# Patient Record
Sex: FEMALE | Race: BLACK OR AFRICAN AMERICAN | NOT HISPANIC OR LATINO | Employment: UNEMPLOYED | URBAN - METROPOLITAN AREA
[De-identification: names, ages, dates, MRNs, and addresses within clinical notes are randomized per-mention and may not be internally consistent; named-entity substitution may affect disease eponyms.]

---

## 2023-03-30 DIAGNOSIS — E11.40 DIABETIC NEUROPATHY, PAINFUL (MULTI): Primary | ICD-10-CM

## 2023-03-30 DIAGNOSIS — N76.0 ACUTE VAGINITIS: ICD-10-CM

## 2023-03-30 RX ORDER — GABAPENTIN 800 MG/1
1 TABLET ORAL 3 TIMES DAILY
COMMUNITY
Start: 2022-01-25 | End: 2023-03-30 | Stop reason: SDUPTHER

## 2023-03-30 RX ORDER — FLUCONAZOLE 150 MG/1
TABLET ORAL
COMMUNITY
Start: 2022-06-28 | End: 2023-03-30 | Stop reason: SDUPTHER

## 2023-03-31 RX ORDER — GABAPENTIN 800 MG/1
800 TABLET ORAL 3 TIMES DAILY
Qty: 270 TABLET | Refills: 0 | Status: SHIPPED | OUTPATIENT
Start: 2023-03-31 | End: 2023-07-12 | Stop reason: SDUPTHER

## 2023-03-31 RX ORDER — FLUCONAZOLE 150 MG/1
150 TABLET ORAL ONCE
Qty: 1 TABLET | Refills: 0 | Status: SHIPPED | OUTPATIENT
Start: 2023-03-31 | End: 2023-03-31

## 2023-05-26 ENCOUNTER — APPOINTMENT (OUTPATIENT)
Dept: PRIMARY CARE | Facility: CLINIC | Age: 43
End: 2023-05-26
Payer: COMMERCIAL

## 2023-06-30 ENCOUNTER — LAB (OUTPATIENT)
Dept: LAB | Facility: LAB | Age: 43
End: 2023-06-30
Payer: COMMERCIAL

## 2023-06-30 ENCOUNTER — OFFICE VISIT (OUTPATIENT)
Dept: PRIMARY CARE | Facility: CLINIC | Age: 43
End: 2023-06-30
Payer: COMMERCIAL

## 2023-06-30 VITALS
OXYGEN SATURATION: 100 % | BODY MASS INDEX: 26.4 KG/M2 | HEART RATE: 105 BPM | TEMPERATURE: 97.1 F | SYSTOLIC BLOOD PRESSURE: 126 MMHG | WEIGHT: 168.2 LBS | HEIGHT: 67 IN | DIASTOLIC BLOOD PRESSURE: 83 MMHG

## 2023-06-30 DIAGNOSIS — E11.69 TYPE 2 DIABETES MELLITUS WITH OTHER SPECIFIED COMPLICATION, WITH LONG-TERM CURRENT USE OF INSULIN (MULTI): ICD-10-CM
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
## 2023-06-30 DIAGNOSIS — E11.69 TYPE 2 DIABETES MELLITUS WITH OTHER SPECIFIED COMPLICATION, WITH LONG-TERM CURRENT USE OF INSULIN (MULTI): Primary | ICD-10-CM

## 2023-06-30 DIAGNOSIS — K59.00 CONSTIPATION, UNSPECIFIED CONSTIPATION TYPE: ICD-10-CM

## 2023-06-30 DIAGNOSIS — Z79.4 TYPE 2 DIABETES MELLITUS WITH OTHER SPECIFIED COMPLICATION, WITH LONG-TERM CURRENT USE OF INSULIN (MULTI): Primary | ICD-10-CM

## 2023-06-30 DIAGNOSIS — R39.15 URINARY URGENCY: ICD-10-CM

## 2023-06-30 DIAGNOSIS — Z79.4 TYPE 2 DIABETES MELLITUS WITH OTHER SPECIFIED COMPLICATION, WITH LONG-TERM CURRENT USE OF INSULIN (MULTI): ICD-10-CM

## 2023-06-30 LAB
ALBUMIN (G/DL) IN SER/PLAS: 3.9 G/DL (ref 3.4–5)
ANION GAP IN SER/PLAS: 11 MMOL/L (ref 10–20)
CALCIUM (MG/DL) IN SER/PLAS: 9.2 MG/DL (ref 8.6–10.6)
CARBON DIOXIDE, TOTAL (MMOL/L) IN SER/PLAS: 26 MMOL/L (ref 21–32)
CHLORIDE (MMOL/L) IN SER/PLAS: 104 MMOL/L (ref 98–107)
CLUE CELLS WET PREP: ABNORMAL
CREATININE (MG/DL) IN SER/PLAS: 0.96 MG/DL (ref 0.5–1.05)
ESTIMATED AVERAGE GLUCOSE FOR HBA1C: 189 MG/DL
GFR FEMALE: 75 ML/MIN/1.73M2
GLUCOSE (MG/DL) IN SER/PLAS: 192 MG/DL (ref 74–99)
HEMOGLOBIN A1C/HEMOGLOBIN TOTAL IN BLOOD: 8.2 %
PHOSPHATE (MG/DL) IN SER/PLAS: 4 MG/DL (ref 2.5–4.9)
POTASSIUM (MMOL/L) IN SER/PLAS: 3.6 MMOL/L (ref 3.5–5.3)
SODIUM (MMOL/L) IN SER/PLAS: 137 MMOL/L (ref 136–145)
TRICH WET PREP: ABNORMAL
UREA NITROGEN (MG/DL) IN SER/PLAS: 7 MG/DL (ref 6–23)
WBC WET PREP: ABNORMAL /HPF
YEAST PRESENCE WET PREP: PRESENT

## 2023-06-30 PROCEDURE — 4010F ACE/ARB THERAPY RXD/TAKEN: CPT | Performed by: STUDENT IN AN ORGANIZED HEALTH CARE EDUCATION/TRAINING PROGRAM

## 2023-06-30 PROCEDURE — 83036 HEMOGLOBIN GLYCOSYLATED A1C: CPT

## 2023-06-30 PROCEDURE — 3079F DIAST BP 80-89 MM HG: CPT | Performed by: STUDENT IN AN ORGANIZED HEALTH CARE EDUCATION/TRAINING PROGRAM

## 2023-06-30 PROCEDURE — 87210 SMEAR WET MOUNT SALINE/INK: CPT

## 2023-06-30 PROCEDURE — 36415 COLL VENOUS BLD VENIPUNCTURE: CPT

## 2023-06-30 PROCEDURE — 87591 N.GONORRHOEAE DNA AMP PROB: CPT

## 2023-06-30 PROCEDURE — 99213 OFFICE O/P EST LOW 20 MIN: CPT | Performed by: STUDENT IN AN ORGANIZED HEALTH CARE EDUCATION/TRAINING PROGRAM

## 2023-06-30 PROCEDURE — 80069 RENAL FUNCTION PANEL: CPT

## 2023-06-30 PROCEDURE — 87491 CHLMYD TRACH DNA AMP PROBE: CPT

## 2023-06-30 PROCEDURE — 1036F TOBACCO NON-USER: CPT | Performed by: STUDENT IN AN ORGANIZED HEALTH CARE EDUCATION/TRAINING PROGRAM

## 2023-06-30 PROCEDURE — 87661 TRICHOMONAS VAGINALIS AMPLIF: CPT

## 2023-06-30 PROCEDURE — 82570 ASSAY OF URINE CREATININE: CPT

## 2023-06-30 PROCEDURE — 3074F SYST BP LT 130 MM HG: CPT | Performed by: STUDENT IN AN ORGANIZED HEALTH CARE EDUCATION/TRAINING PROGRAM

## 2023-06-30 PROCEDURE — 84156 ASSAY OF PROTEIN URINE: CPT

## 2023-06-30 PROCEDURE — 3052F HG A1C>EQUAL 8.0%<EQUAL 9.0%: CPT | Performed by: STUDENT IN AN ORGANIZED HEALTH CARE EDUCATION/TRAINING PROGRAM

## 2023-06-30 RX ORDER — INSULIN GLARGINE 100 [IU]/ML
75 INJECTION, SOLUTION SUBCUTANEOUS NIGHTLY
Qty: 67.5 ML | Refills: 3 | Status: SHIPPED | OUTPATIENT
Start: 2023-06-30 | End: 2023-07-12 | Stop reason: SDUPTHER

## 2023-06-30 RX ORDER — LANCETS
EACH MISCELLANEOUS
Qty: 100 EACH | Refills: 3 | Status: SHIPPED | OUTPATIENT
Start: 2023-06-30 | End: 2024-03-22

## 2023-06-30 RX ORDER — INSULIN LISPRO 100 [IU]/ML
INJECTION, SOLUTION INTRAVENOUS; SUBCUTANEOUS
COMMUNITY
End: 2023-06-30 | Stop reason: SDUPTHER

## 2023-06-30 RX ORDER — PEN NEEDLE, DIABETIC 30 GX3/16"
NEEDLE, DISPOSABLE MISCELLANEOUS
Qty: 100 EACH | Refills: 11 | Status: SHIPPED | OUTPATIENT
Start: 2023-06-30 | End: 2024-05-31 | Stop reason: SDUPTHER

## 2023-06-30 RX ORDER — ATORVASTATIN CALCIUM 40 MG/1
TABLET, FILM COATED ORAL
COMMUNITY
Start: 2022-06-30 | End: 2023-07-23 | Stop reason: SDUPTHER

## 2023-06-30 RX ORDER — INSULIN LISPRO 100 [IU]/ML
75 INJECTION, SOLUTION INTRAVENOUS; SUBCUTANEOUS NIGHTLY
Qty: 10 ML | Refills: 3 | Status: SHIPPED | OUTPATIENT
Start: 2023-06-30 | End: 2023-06-30 | Stop reason: ENTERED-IN-ERROR

## 2023-06-30 RX ORDER — ISOPROPYL ALCOHOL 70 ML/100ML
LIQUID TOPICAL DAILY
Qty: 3840 ML | Refills: 0 | Status: SHIPPED | OUTPATIENT
Start: 2023-06-30 | End: 2024-06-29

## 2023-06-30 RX ORDER — POLYETHYLENE GLYCOL 3350 17 G/17G
17 POWDER, FOR SOLUTION ORAL DAILY
Qty: 120 PACKET | Refills: 0 | Status: SHIPPED | OUTPATIENT
Start: 2023-06-30 | End: 2023-10-28

## 2023-06-30 RX ORDER — BLOOD-GLUCOSE SENSOR
1 EACH MISCELLANEOUS 2 TIMES DAILY
Qty: 1 EACH | Refills: 0 | Status: SHIPPED | OUTPATIENT
Start: 2023-06-30 | End: 2023-07-23

## 2023-06-30 ASSESSMENT — PAIN SCALES - GENERAL: PAINLEVEL: 8

## 2023-06-30 NOTE — PROGRESS NOTES
"Subjective   Patient ID: Patricia Vieyra is a 42 y.o. female who presents for No chief complaint on file..    HPI   42-year-old female with past medical history of hypertension, bipolar, uncontrolled DM2 complicated by neuropathy, glaucoma who presents to the clinic for follow-up on diabetes and medication refill.    # Diabetes  - Current regimen: Lantus 75 units at bedtime, Jardiance 25 mg every day, Trulicity 1.5 mg weekly  - Diabetes Surveillance: last HbA1C 8.2%  - Diabetes Complications: Neuropathy (on Gabapentin 800 mg TID)  - Denies any polyuria, polydipsia, dysuria, fatigue, weight loss, changes in vision, changes in bowels, HA dizziness, lightheadedness, or weakness.   -Quit smoking 4-5 months ago    #Recurrent Vaginal yeast infection   -Requesting to be tested for GC/CT/Tric   -No vaginal discharge, dysuria, flank/pelvic/abdominal pain  -Endorses \"urinary pressure\"  -Previously has Diflucan several times for yeast infection    Review of Systems    ROS:    - CONSTITUTIONAL: Denies weight loss, fever and chills.    - HEENT: Denies changes in vision and hearing.    - RESPIRATORY: Denies SOB and cough.    - CV: Denies palpitations and CP.    - GI: Denies abdominal pain, nausea, vomiting and diarrhea.    - : Denies dysuria and urinary frequency.    - MSK: Denies myalgia and joint pain.    - SKIN: Denies rash and pruritus.    - NEUROLOGICAL: Denies headache and syncope.    A 12-point review of systems was completed and is otherwise negative except as noted in the HPI.      Objective     Vitals:    06/30/23 0947   BP: 126/83   Pulse: 105   Temp: 36.2 °C (97.1 °F)   SpO2: 100%        Physical Exam    PHYSICAL EXAM:    - GENERAL: Alert and oriented x 3. No acute distress. Well-nourished.    - EYES: EOMI. Anicteric.    - HENT: Moist mucous membranes. No scleral icterus. No cervical lymphadenopathy.    - LUNGS: Clear to auscultation bilaterally. No accessory muscle use.    - CARDIOVASCULAR: Regular rate and rhythm. " No murmur. No JVD.    - ABDOMEN: Soft, non-tender and non-distended. No palpable masses.    - EXTREMITIES: No edema. Non-tender.?SKIN: No rashes or lesions. Warm.    - NEUROLOGIC: No focal neurological deficits. CN II-XII grossly intact, but not individually tested.    - PSYCHIATRIC: Cooperative. Appropriate mood and affect.     Assessment/Plan       42-year-old female with past medical history of hypertension, bipolar, uncontrolled DM2 complicated by neuropathy, glaucoma who presents to the clinic for follow-up on diabetes and medication refill.    #DM2, Last A1c 8.2%  -Continue with Lantus 75 units at bedtime, Jardiance 25 mg every day, Trulicity 1.5 mg weekly  -Continue with lisinopril 10 mg once daily  -Continue with atorvastatin 40 mg once daily  -Continue with gabapentin 800 mg 3 times daily for neuropathy  -Labs: A1c, RFP ordered  -Diabetic supplies ordered  -Hypoglycemic precautions provided    #Recurrent Vaginal yeast infection   -GC/CT/Trich/wet prep  -Will Rx Diflucan in event of another yeast infection     Patient discussed with attending physician Dr. Phan Woods MD  Family Medicine, PGY-2    This note was completed using Dragon voice recognition technology and may include unintended errors with respect to translation of words, typographical errors or grammar errors which may not have been identified while finalizing the chart.

## 2023-07-01 LAB
CHLAMYDIA TRACH., AMPLIFIED: NEGATIVE
N. GONORRHEA, AMPLIFIED: NEGATIVE
TRICHOMONAS VAGINALIS: NEGATIVE

## 2023-07-02 DIAGNOSIS — E11.69 TYPE 2 DIABETES MELLITUS WITH OTHER SPECIFIED COMPLICATION, WITH LONG-TERM CURRENT USE OF INSULIN (MULTI): ICD-10-CM

## 2023-07-02 DIAGNOSIS — Z79.4 TYPE 2 DIABETES MELLITUS WITH OTHER SPECIFIED COMPLICATION, WITH LONG-TERM CURRENT USE OF INSULIN (MULTI): ICD-10-CM

## 2023-07-02 DIAGNOSIS — B37.31 YEAST INFECTION OF THE VAGINA: Primary | ICD-10-CM

## 2023-07-02 RX ORDER — EMPAGLIFLOZIN 25 MG/1
25 TABLET, FILM COATED ORAL DAILY
COMMUNITY
End: 2023-12-22 | Stop reason: SINTOL

## 2023-07-02 RX ORDER — DULAGLUTIDE 1.5 MG/.5ML
1.5 INJECTION, SOLUTION SUBCUTANEOUS
COMMUNITY
Start: 2022-01-04 | End: 2023-07-02 | Stop reason: SDUPTHER

## 2023-07-02 RX ORDER — FLUCONAZOLE 150 MG/1
150 TABLET ORAL ONCE
Qty: 1 TABLET | Refills: 0 | Status: SHIPPED | OUTPATIENT
Start: 2023-07-02 | End: 2023-07-23

## 2023-07-02 RX ORDER — DULAGLUTIDE 1.5 MG/.5ML
1.5 INJECTION, SOLUTION SUBCUTANEOUS
Qty: 8 EACH | Refills: 2 | Status: SHIPPED | OUTPATIENT
Start: 2023-07-02 | End: 2023-07-12 | Stop reason: SDUPTHER

## 2023-07-03 LAB
CREATININE (MG/DL) IN URINE: 138 MG/DL (ref 20–320)
PROTEIN (MG/DL) IN URINE: 27 MG/DL (ref 5–24)
PROTEIN/CREATININE (MG/MG) IN URINE: 0.2 MG/MG CREAT (ref 0–0.17)

## 2023-07-09 RX ORDER — LISINOPRIL 10 MG/1
10 TABLET ORAL DAILY
COMMUNITY
Start: 2022-06-28 | End: 2023-07-23 | Stop reason: SDUPTHER

## 2023-07-09 RX ORDER — LISINOPRIL 10 MG/1
10 TABLET ORAL DAILY
Qty: 90 TABLET | Refills: 0 | Status: CANCELLED | OUTPATIENT
Start: 2023-07-09 | End: 2023-10-07

## 2023-07-10 DIAGNOSIS — Z79.4 TYPE 2 DIABETES MELLITUS WITH OTHER SPECIFIED COMPLICATION, WITH LONG-TERM CURRENT USE OF INSULIN (MULTI): ICD-10-CM

## 2023-07-10 DIAGNOSIS — E11.40 DIABETIC NEUROPATHY, PAINFUL (MULTI): ICD-10-CM

## 2023-07-10 DIAGNOSIS — E11.69 TYPE 2 DIABETES MELLITUS WITH OTHER SPECIFIED COMPLICATION, WITH LONG-TERM CURRENT USE OF INSULIN (MULTI): ICD-10-CM

## 2023-07-12 NOTE — PROGRESS NOTES
I reviewed with the resident the medical history and the resident’s findings on physical examination.  I discussed with the resident the patient’s diagnosis and concur with the treatment plan as documented in the resident note.     Mavis Chisholm MD

## 2023-07-15 RX ORDER — INSULIN GLARGINE 100 [IU]/ML
75 INJECTION, SOLUTION SUBCUTANEOUS NIGHTLY
Qty: 67.5 ML | Refills: 3 | Status: SHIPPED | OUTPATIENT
Start: 2023-07-15 | End: 2023-08-27 | Stop reason: SDUPTHER

## 2023-07-15 RX ORDER — GABAPENTIN 800 MG/1
800 TABLET ORAL 3 TIMES DAILY
Qty: 270 TABLET | Refills: 0 | Status: SHIPPED | OUTPATIENT
Start: 2023-07-15 | End: 2023-07-23 | Stop reason: SDUPTHER

## 2023-07-15 RX ORDER — DULAGLUTIDE 1.5 MG/.5ML
1.5 INJECTION, SOLUTION SUBCUTANEOUS
Qty: 8 EACH | Refills: 2 | Status: SHIPPED | OUTPATIENT
Start: 2023-07-15 | End: 2023-12-22 | Stop reason: WASHOUT

## 2023-07-20 DIAGNOSIS — E11.69 TYPE 2 DIABETES MELLITUS WITH OTHER SPECIFIED COMPLICATION, WITH LONG-TERM CURRENT USE OF INSULIN (MULTI): ICD-10-CM

## 2023-07-20 DIAGNOSIS — B37.31 YEAST INFECTION OF THE VAGINA: ICD-10-CM

## 2023-07-20 DIAGNOSIS — Z79.4 TYPE 2 DIABETES MELLITUS WITH OTHER SPECIFIED COMPLICATION, WITH LONG-TERM CURRENT USE OF INSULIN (MULTI): ICD-10-CM

## 2023-07-23 DIAGNOSIS — Z79.4 TYPE 2 DIABETES MELLITUS WITH OTHER SPECIFIED COMPLICATION, WITH LONG-TERM CURRENT USE OF INSULIN (MULTI): Primary | ICD-10-CM

## 2023-07-23 DIAGNOSIS — E78.5 HYPERLIPIDEMIA, UNSPECIFIED HYPERLIPIDEMIA TYPE: ICD-10-CM

## 2023-07-23 DIAGNOSIS — E11.40 DIABETIC NEUROPATHY, PAINFUL (MULTI): ICD-10-CM

## 2023-07-23 DIAGNOSIS — M54.50 LOW BACK PAIN, UNSPECIFIED BACK PAIN LATERALITY, UNSPECIFIED CHRONICITY, UNSPECIFIED WHETHER SCIATICA PRESENT: ICD-10-CM

## 2023-07-23 DIAGNOSIS — E11.69 TYPE 2 DIABETES MELLITUS WITH OTHER SPECIFIED COMPLICATION, WITH LONG-TERM CURRENT USE OF INSULIN (MULTI): Primary | ICD-10-CM

## 2023-07-23 RX ORDER — FLUCONAZOLE 150 MG/1
150 TABLET ORAL ONCE
Qty: 1 TABLET | Refills: 0 | Status: SHIPPED | OUTPATIENT
Start: 2023-07-23 | End: 2023-08-24

## 2023-07-23 RX ORDER — BLOOD-GLUCOSE SENSOR
EACH MISCELLANEOUS
Qty: 1 EACH | Refills: 0 | Status: SHIPPED | OUTPATIENT
Start: 2023-07-23 | End: 2023-08-22

## 2023-07-23 RX ORDER — GABAPENTIN 800 MG/1
800 TABLET ORAL 3 TIMES DAILY
Qty: 90 TABLET | Refills: 3 | Status: SHIPPED | OUTPATIENT
Start: 2023-07-23 | End: 2023-11-02 | Stop reason: SDUPTHER

## 2023-07-23 RX ORDER — LISINOPRIL 10 MG/1
10 TABLET ORAL DAILY
Qty: 90 TABLET | Refills: 0 | Status: SHIPPED | OUTPATIENT
Start: 2023-07-23 | End: 2023-12-22 | Stop reason: SDUPTHER

## 2023-07-23 RX ORDER — PEN NEEDLE, DIABETIC 32GX 5/32"
NEEDLE, DISPOSABLE MISCELLANEOUS DAILY
Qty: 100 EACH | Refills: 11 | Status: SHIPPED | OUTPATIENT
Start: 2023-07-23 | End: 2024-05-31 | Stop reason: SDUPTHER

## 2023-07-23 RX ORDER — ATORVASTATIN CALCIUM 40 MG/1
40 TABLET, FILM COATED ORAL DAILY
Qty: 90 TABLET | Refills: 1 | Status: SHIPPED | OUTPATIENT
Start: 2023-07-23 | End: 2023-12-22 | Stop reason: SDUPTHER

## 2023-07-27 ENCOUNTER — TELEPHONE (OUTPATIENT)
Dept: PRIMARY CARE | Facility: CLINIC | Age: 43
End: 2023-07-27

## 2023-07-27 RX ORDER — LIDOCAINE 50 MG/G
1 PATCH TOPICAL DAILY
Qty: 30 PATCH | Refills: 11 | Status: SHIPPED | OUTPATIENT
Start: 2023-07-27 | End: 2024-07-26

## 2023-07-27 NOTE — TELEPHONE ENCOUNTER
PT is calling asking for a fill of their Lidocaine patches.  Says its going on 2 weeks to try to reach someone regarding this refill and is in a lot of pain.   & Also asking for DME which is a Cane to assist her in walking.

## 2023-07-27 NOTE — TELEPHONE ENCOUNTER
Spoke to the patient. Ordered lidocaine patch, cane, and addressed her other concerns. Can someone assist her with scheduling an appointment with the clinical pharmacist? I have put in the order for that and explained her the steps but it seems like she might need help and she may call the clinic to ask for assistance with that. Appreciate your help!

## 2023-08-08 DIAGNOSIS — E11.69 TYPE 2 DIABETES MELLITUS WITH OTHER SPECIFIED COMPLICATION, WITH LONG-TERM CURRENT USE OF INSULIN (MULTI): ICD-10-CM

## 2023-08-08 DIAGNOSIS — B37.31 YEAST INFECTION OF THE VAGINA: ICD-10-CM

## 2023-08-08 DIAGNOSIS — Z79.4 TYPE 2 DIABETES MELLITUS WITH OTHER SPECIFIED COMPLICATION, WITH LONG-TERM CURRENT USE OF INSULIN (MULTI): ICD-10-CM

## 2023-08-18 ENCOUNTER — APPOINTMENT (OUTPATIENT)
Dept: PRIMARY CARE | Facility: CLINIC | Age: 43
End: 2023-08-18

## 2023-08-24 RX ORDER — FLUCONAZOLE 150 MG/1
150 TABLET ORAL ONCE
Qty: 1 TABLET | Refills: 0 | Status: SHIPPED | OUTPATIENT
Start: 2023-08-24 | End: 2023-08-24

## 2023-08-24 RX ORDER — BLOOD-GLUCOSE SENSOR
EACH MISCELLANEOUS
Refills: 0 | OUTPATIENT
Start: 2023-08-24 | End: 2023-09-23

## 2023-08-27 DIAGNOSIS — Z79.4 TYPE 2 DIABETES MELLITUS WITH OTHER SPECIFIED COMPLICATION, WITH LONG-TERM CURRENT USE OF INSULIN (MULTI): ICD-10-CM

## 2023-08-27 DIAGNOSIS — E11.69 TYPE 2 DIABETES MELLITUS WITH OTHER SPECIFIED COMPLICATION, WITH LONG-TERM CURRENT USE OF INSULIN (MULTI): ICD-10-CM

## 2023-08-27 RX ORDER — INSULIN GLARGINE 100 [IU]/ML
75 INJECTION, SOLUTION SUBCUTANEOUS NIGHTLY
Qty: 67.5 ML | Refills: 3 | Status: SHIPPED | OUTPATIENT
Start: 2023-08-27 | End: 2023-12-22 | Stop reason: SDUPTHER

## 2023-09-01 ENCOUNTER — APPOINTMENT (OUTPATIENT)
Dept: PRIMARY CARE | Facility: CLINIC | Age: 43
End: 2023-09-01
Payer: COMMERCIAL

## 2023-11-02 ENCOUNTER — TELEPHONE (OUTPATIENT)
Dept: PRIMARY CARE | Facility: CLINIC | Age: 43
End: 2023-11-02

## 2023-11-02 DIAGNOSIS — E11.40 DIABETIC NEUROPATHY, PAINFUL (MULTI): ICD-10-CM

## 2023-11-06 RX ORDER — GABAPENTIN 800 MG/1
800 TABLET ORAL 3 TIMES DAILY
Qty: 90 TABLET | Refills: 1 | Status: SHIPPED | OUTPATIENT
Start: 2023-11-06 | End: 2023-11-07 | Stop reason: SDUPTHER

## 2023-11-07 DIAGNOSIS — E11.40 DIABETIC NEUROPATHY, PAINFUL (MULTI): ICD-10-CM

## 2023-11-07 RX ORDER — GABAPENTIN 800 MG/1
800 TABLET ORAL 3 TIMES DAILY
Qty: 270 TABLET | Refills: 0 | Status: SHIPPED | OUTPATIENT
Start: 2023-11-07 | End: 2023-12-22 | Stop reason: SDUPTHER

## 2023-12-22 ENCOUNTER — OFFICE VISIT (OUTPATIENT)
Dept: PRIMARY CARE | Facility: CLINIC | Age: 43
End: 2023-12-22
Payer: COMMERCIAL

## 2023-12-22 VITALS
WEIGHT: 178.8 LBS | HEIGHT: 67 IN | OXYGEN SATURATION: 100 % | DIASTOLIC BLOOD PRESSURE: 100 MMHG | SYSTOLIC BLOOD PRESSURE: 161 MMHG | TEMPERATURE: 98.3 F | HEART RATE: 99 BPM | BODY MASS INDEX: 28.06 KG/M2

## 2023-12-22 DIAGNOSIS — E11.40 DIABETIC NEUROPATHY, PAINFUL (MULTI): ICD-10-CM

## 2023-12-22 DIAGNOSIS — B37.9 YEAST INFECTION: ICD-10-CM

## 2023-12-22 DIAGNOSIS — Z91.81 AT RISK FOR FALLING: ICD-10-CM

## 2023-12-22 DIAGNOSIS — R32 INCONTINENCE IN FEMALE: Primary | ICD-10-CM

## 2023-12-22 DIAGNOSIS — Z79.4 TYPE 2 DIABETES MELLITUS WITH OTHER SPECIFIED COMPLICATION, WITH LONG-TERM CURRENT USE OF INSULIN (MULTI): ICD-10-CM

## 2023-12-22 DIAGNOSIS — E78.5 HYPERLIPIDEMIA, UNSPECIFIED HYPERLIPIDEMIA TYPE: ICD-10-CM

## 2023-12-22 DIAGNOSIS — E11.69 TYPE 2 DIABETES MELLITUS WITH OTHER SPECIFIED COMPLICATION, WITH LONG-TERM CURRENT USE OF INSULIN (MULTI): ICD-10-CM

## 2023-12-22 PROCEDURE — 4010F ACE/ARB THERAPY RXD/TAKEN: CPT | Performed by: STUDENT IN AN ORGANIZED HEALTH CARE EDUCATION/TRAINING PROGRAM

## 2023-12-22 PROCEDURE — 3052F HG A1C>EQUAL 8.0%<EQUAL 9.0%: CPT | Performed by: STUDENT IN AN ORGANIZED HEALTH CARE EDUCATION/TRAINING PROGRAM

## 2023-12-22 PROCEDURE — 99214 OFFICE O/P EST MOD 30 MIN: CPT | Performed by: STUDENT IN AN ORGANIZED HEALTH CARE EDUCATION/TRAINING PROGRAM

## 2023-12-22 PROCEDURE — 1036F TOBACCO NON-USER: CPT | Performed by: STUDENT IN AN ORGANIZED HEALTH CARE EDUCATION/TRAINING PROGRAM

## 2023-12-22 PROCEDURE — 3077F SYST BP >= 140 MM HG: CPT | Performed by: STUDENT IN AN ORGANIZED HEALTH CARE EDUCATION/TRAINING PROGRAM

## 2023-12-22 PROCEDURE — 3080F DIAST BP >= 90 MM HG: CPT | Performed by: STUDENT IN AN ORGANIZED HEALTH CARE EDUCATION/TRAINING PROGRAM

## 2023-12-22 RX ORDER — GABAPENTIN 800 MG/1
800 TABLET ORAL 3 TIMES DAILY
Qty: 270 TABLET | Refills: 1 | Status: SHIPPED | OUTPATIENT
Start: 2023-12-22 | End: 2024-05-31 | Stop reason: SDUPTHER

## 2023-12-22 RX ORDER — FLUCONAZOLE 150 MG/1
150 TABLET ORAL DAILY
Qty: 2 TABLET | Refills: 0 | Status: SHIPPED | OUTPATIENT
Start: 2023-12-22 | End: 2024-04-25 | Stop reason: SDUPTHER

## 2023-12-22 RX ORDER — ATORVASTATIN CALCIUM 40 MG/1
40 TABLET, FILM COATED ORAL DAILY
Qty: 90 TABLET | Refills: 3 | Status: SHIPPED | OUTPATIENT
Start: 2023-12-22 | End: 2024-03-05 | Stop reason: SDUPTHER

## 2023-12-22 RX ORDER — DULAGLUTIDE 0.75 MG/.5ML
0.75 INJECTION, SOLUTION SUBCUTANEOUS
Qty: 2 ML | Refills: 2 | Status: SHIPPED | OUTPATIENT
Start: 2023-12-22 | End: 2024-03-05 | Stop reason: SDUPTHER

## 2023-12-22 RX ORDER — DIAPER,BRIEF,ADULT, DISPOSABLE
1 EACH MISCELLANEOUS DAILY
Qty: 80 EACH | Refills: 11 | Status: SHIPPED | OUTPATIENT
Start: 2023-12-22 | End: 2023-12-22 | Stop reason: SDUPTHER

## 2023-12-22 RX ORDER — INSULIN GLARGINE 100 [IU]/ML
75 INJECTION, SOLUTION SUBCUTANEOUS NIGHTLY
Qty: 67.5 ML | Refills: 3 | Status: SHIPPED | OUTPATIENT
Start: 2023-12-22 | End: 2024-03-05 | Stop reason: SDUPTHER

## 2023-12-22 RX ORDER — LISINOPRIL 10 MG/1
10 TABLET ORAL DAILY
Qty: 90 TABLET | Refills: 3 | Status: SHIPPED | OUTPATIENT
Start: 2023-12-22 | End: 2024-03-05 | Stop reason: SDUPTHER

## 2023-12-22 RX ORDER — DIAPER,BRIEF,ADULT, DISPOSABLE
1 EACH MISCELLANEOUS DAILY
Qty: 80 EACH | Refills: 11 | Status: SHIPPED | OUTPATIENT
Start: 2023-12-22 | End: 2024-03-05 | Stop reason: SDUPTHER

## 2023-12-22 ASSESSMENT — PAIN SCALES - GENERAL: PAINLEVEL: 10-WORST PAIN EVER

## 2023-12-22 ASSESSMENT — ENCOUNTER SYMPTOMS
LOSS OF SENSATION IN FEET: 1
DEPRESSION: 1
OCCASIONAL FEELINGS OF UNSTEADINESS: 1

## 2023-12-22 NOTE — PROGRESS NOTES
"Subjective   Patient ID: Patricia Vieyra is a 43 y.o. female who presents for Follow-up (Diabetes), Med Refill (Needs a shower chair and a cane), and Pain (Legs and Feet).    HPI  #shower chair   Was told by care source to ask for a shower chair and a cane  She has difficulty with ambulation  She has children who help her out but they have their own lives  She has custody of 5 grandchildren  Dr. Gomez tried to order a cane but she never received it  She also needs a shower chair, she is afraid to fall  Endorses falling often in th shower, last fell 3 days ago  Her legs give out and she can't sit or standing for prolonged periods due to her pain    #htn  Needs refills on her meds  Has been out of her meds for 2 weeks  She has been having headaches    #hm  Can't physically stand and cook her own meals  Interested in meals on wheels     #dm  Needs to get cataract surgery  Hasn't seen a foot doctor in 2 years and he said there was nothing he can do for pain  Her daughhter who is stna helps her to make sure she doesn't have ulcers     #yeast infections  Has a history of yeast infections   Presents as discharge and itchiness  Doesn't have one now but gets them often  Last time she had an infection was 3 weeks ago    #hx of bowel and bladder incontinence  Has never been evaluated for this  Endorses some numbness in her lower back  Using depends     Review of Systems  ROS negative except for above mentioned.      Objective   BP (!) 161/100 (BP Location: Right arm, Patient Position: Sitting, BP Cuff Size: Adult)   Pulse 99   Temp 36.8 °C (98.3 °F) (Temporal)   Ht 1.702 m (5' 7\")   Wt 81.1 kg (178 lb 12.8 oz)   SpO2 100%   BMI 28.00 kg/m²     Physical Exam  General: appears to be in no acute distress.   HEENT: Normocephalic, atraumatic.   Cardiac: Normal S1 and S2. No S3, S4, or murmurs. Rhythm is regular. No peripheral edema, cyanosis, or pallor. Extremities warm and well perfused.   Lungs: Clear to auscultation " bilaterally. No rales, rhonchi, wheezing, diminished breath sounds.   MSK: ROM intact spine and extremities. Endorses tenderness in legs during palpation and ambulation. Diminished strength in bilateral lower extremity.     Assessment/Plan   A 43 -year-old female with past medical history of hypertension, bipolar, uncontrolled DM2 complicated by neuropathy, glaucoma presents for multiple complaints.   Problem List Items Addressed This Visit    None  Visit Diagnoses       Incontinence in female    -  Primary  Chronic, complicated, uncontrolled    Relevant Medications    Ordered diaper,brief,adult,disposable ([employer]) misc to be used as needed     Other Relevant Orders    CT lumbar spine wo IV contrast for further eval     Type 2 diabetes mellitus with other specified complication, with long-term current use of insulin (CMS/HCC)      Chronic, stable     Relevant Medications    Lab Results   Component Value Date    HGBA1C 8.2 (A) 06/30/2023        Continue with insulin glargine (Lantus) 100 unit/mL injection 75 units at night     Start dulaglutide (Trulicity) 0.75 mg/0.5 mL pen injector once a week  Will uptitrate based on tolerability  Stop Jardiance due to hx of multiple yeast infections      Other Relevant Orders    Hemoglobin A1c ordered today    Referral to Podiatry for DM foot exam     Diabetic neuropathy, painful (CMS/HCC)      Chronic, stable     Relevant Medications    Continue with gabapentin (Neurontin) 800 mg tablet TID    miscellaneous medical supply misc- ordered shower chair    miscellaneous medical supply misc- ordered cane  It is medically indicated for patient to use cane and shower chair due to severe peripheral neuropathy and high falls risk     Hyperlipidemia, unspecified hyperlipidemia type      Chronic, stable     Relevant Medications    Continue with atorvastatin (Lipitor) 40 mg tablet once dailu  Refilled today     Yeast infection      Acute, uncomplicated     Relevant  Medications    fluconazole (Diflucan) 150 mg tablet  Stop Jardiance     At risk for falling        Relevant Medications    miscellaneous medical supply misc - ordered shower chair    miscellaneous medical supply misc - ordered cane    #HTN   Chronic, uncontrolled  Likely 2/2 to medication noncompliance   -continue with Lisinopril 10 mg  Patient to keep home bp log and bring to next visit       RTC in 1 month for a fu on DM and HTN    Patient seen and discussed with attending, Dr. Trent Solorio MD  Family Medicine, PGY-3

## 2023-12-22 NOTE — PROGRESS NOTES
I saw and evaluated the patient. I personally obtained the key and critical portions of the history and physical exam or was physically present for key and critical portions performed by the resident/fellow. I reviewed the resident/fellow's documentation and discussed the patient with the resident/fellow. I agree with the resident/fellow's medical decision making as documented in the note.    Elio Newman MD

## 2024-02-01 ENCOUNTER — APPOINTMENT (OUTPATIENT)
Dept: RADIOLOGY | Facility: HOSPITAL | Age: 44
End: 2024-02-01
Payer: COMMERCIAL

## 2024-02-01 ENCOUNTER — HOSPITAL ENCOUNTER (EMERGENCY)
Facility: HOSPITAL | Age: 44
Discharge: HOME | End: 2024-02-02
Attending: EMERGENCY MEDICINE
Payer: COMMERCIAL

## 2024-02-01 ENCOUNTER — CLINICAL SUPPORT (OUTPATIENT)
Dept: EMERGENCY MEDICINE | Facility: HOSPITAL | Age: 44
End: 2024-02-01
Payer: COMMERCIAL

## 2024-02-01 DIAGNOSIS — R46.89 BEHAVIOR CONCERN: Primary | ICD-10-CM

## 2024-02-01 LAB
ABO GROUP (TYPE) IN BLOOD: NORMAL
ALBUMIN SERPL BCP-MCNC: 4.5 G/DL (ref 3.4–5)
ALP SERPL-CCNC: 80 U/L (ref 33–110)
ALT SERPL W P-5'-P-CCNC: 17 U/L (ref 7–45)
AMMONIA PLAS-SCNC: 22 UMOL/L (ref 16–53)
AMPHETAMINES UR QL SCN: ABNORMAL
ANION GAP BLDV CALCULATED.4IONS-SCNC: 10 MMOL/L (ref 10–25)
ANION GAP SERPL CALC-SCNC: 13 MMOL/L (ref 10–20)
ANTIBODY SCREEN: NORMAL
APAP SERPL-MCNC: <10 UG/ML
APPEARANCE UR: CLEAR
AST SERPL W P-5'-P-CCNC: 20 U/L (ref 9–39)
B-HCG SERPL-ACNC: <3 MIU/ML
BARBITURATES UR QL SCN: ABNORMAL
BASE EXCESS BLDV CALC-SCNC: 2.1 MMOL/L (ref -2–3)
BASOPHILS # BLD MANUAL: 0 X10*3/UL (ref 0–0.1)
BASOPHILS NFR BLD MANUAL: 0 %
BENZODIAZ UR QL SCN: ABNORMAL
BILIRUB DIRECT SERPL-MCNC: 0.1 MG/DL (ref 0–0.3)
BILIRUB SERPL-MCNC: 0.5 MG/DL (ref 0–1.2)
BILIRUB UR STRIP.AUTO-MCNC: NEGATIVE MG/DL
BODY TEMPERATURE: 37 DEGREES CELSIUS
BUN SERPL-MCNC: 13 MG/DL (ref 6–23)
BZE UR QL SCN: ABNORMAL
CA-I BLDV-SCNC: 1.19 MMOL/L (ref 1.1–1.33)
CALCIUM SERPL-MCNC: 9.6 MG/DL (ref 8.6–10.6)
CANNABINOIDS UR QL SCN: ABNORMAL
CARDIAC TROPONIN I PNL SERPL HS: 3 NG/L (ref 0–34)
CHLORIDE BLDV-SCNC: 102 MMOL/L (ref 98–107)
CHLORIDE SERPL-SCNC: 102 MMOL/L (ref 98–107)
CO2 SERPL-SCNC: 25 MMOL/L (ref 21–32)
COLOR UR: YELLOW
CREAT SERPL-MCNC: 0.93 MG/DL (ref 0.5–1.05)
EGFRCR SERPLBLD CKD-EPI 2021: 78 ML/MIN/1.73M*2
EOSINOPHIL # BLD MANUAL: 0 X10*3/UL (ref 0–0.7)
EOSINOPHIL NFR BLD MANUAL: 0 %
ERYTHROCYTE [DISTWIDTH] IN BLOOD BY AUTOMATED COUNT: 21 % (ref 11.5–14.5)
ETHANOL SERPL-MCNC: <10 MG/DL
FENTANYL+NORFENTANYL UR QL SCN: ABNORMAL
FLUAV RNA RESP QL NAA+PROBE: NOT DETECTED
FLUBV RNA RESP QL NAA+PROBE: NOT DETECTED
GLUCOSE BLDV-MCNC: 356 MG/DL (ref 74–99)
GLUCOSE SERPL-MCNC: 330 MG/DL (ref 74–99)
GLUCOSE UR STRIP.AUTO-MCNC: ABNORMAL MG/DL
HCO3 BLDV-SCNC: 27.3 MMOL/L (ref 22–26)
HCT VFR BLD AUTO: 33.1 % (ref 36–46)
HCT VFR BLD EST: 34 % (ref 36–46)
HGB BLD-MCNC: 10.9 G/DL (ref 12–16)
HGB BLDV-MCNC: 11.4 G/DL (ref 12–16)
HIV 1+2 AB+HIV1 P24 AG SERPL QL IA: NONREACTIVE
HYALINE CASTS #/AREA URNS AUTO: ABNORMAL /LPF
HYPOCHROMIA BLD QL SMEAR: NORMAL
IMM GRANULOCYTES # BLD AUTO: 0.02 X10*3/UL (ref 0–0.7)
IMM GRANULOCYTES NFR BLD AUTO: 0.3 % (ref 0–0.9)
INHALED O2 CONCENTRATION: 0 %
KETONES UR STRIP.AUTO-MCNC: ABNORMAL MG/DL
LACTATE BLDV-SCNC: 1.3 MMOL/L (ref 0.4–2)
LEUKOCYTE ESTERASE UR QL STRIP.AUTO: NEGATIVE
LYMPHOCYTES # BLD MANUAL: 2.92 X10*3/UL (ref 1.2–4.8)
LYMPHOCYTES NFR BLD MANUAL: 40.5 %
MAGNESIUM SERPL-MCNC: 1.51 MG/DL (ref 1.6–2.4)
MCH RBC QN AUTO: 24.7 PG (ref 26–34)
MCHC RBC AUTO-ENTMCNC: 32.9 G/DL (ref 32–36)
MCV RBC AUTO: 75 FL (ref 80–100)
MONOCYTES # BLD MANUAL: 0.43 X10*3/UL (ref 0.1–1)
MONOCYTES NFR BLD MANUAL: 6 %
MUCOUS THREADS #/AREA URNS AUTO: ABNORMAL /LPF
NEUTS SEG # BLD MANUAL: 3.72 X10*3/UL (ref 1.2–7)
NEUTS SEG NFR BLD MANUAL: 51.7 %
NITRITE UR QL STRIP.AUTO: NEGATIVE
NRBC BLD-RTO: 0 /100 WBCS (ref 0–0)
OPIATES UR QL SCN: ABNORMAL
OVALOCYTES BLD QL SMEAR: NORMAL
OXYCODONE+OXYMORPHONE UR QL SCN: ABNORMAL
OXYHGB MFR BLDV: 27.1 % (ref 45–75)
PCO2 BLDV: 44 MM HG (ref 41–51)
PCP UR QL SCN: ABNORMAL
PH BLDV: 7.4 PH (ref 7.33–7.43)
PH UR STRIP.AUTO: 6 [PH]
PLASMA CELLS # BLD MANUAL: 0.06 X10*3/UL
PLASMA CELLS NFR BLD MANUAL: 0.9 %
PLATELET # BLD AUTO: 305 X10*3/UL (ref 150–450)
PO2 BLDV: 28 MM HG (ref 35–45)
POTASSIUM BLDV-SCNC: 4.6 MMOL/L (ref 3.5–5.3)
POTASSIUM SERPL-SCNC: 4.3 MMOL/L (ref 3.5–5.3)
PROT SERPL-MCNC: 7.6 G/DL (ref 6.4–8.2)
PROT UR STRIP.AUTO-MCNC: ABNORMAL MG/DL
RBC # BLD AUTO: 4.42 X10*6/UL (ref 4–5.2)
RBC # UR STRIP.AUTO: NEGATIVE /UL
RBC #/AREA URNS AUTO: ABNORMAL /HPF
RBC MORPH BLD: NORMAL
RH FACTOR (ANTIGEN D): NORMAL
SALICYLATES SERPL-MCNC: <3 MG/DL
SAO2 % BLDV: 28 % (ref 45–75)
SARS-COV-2 RNA RESP QL NAA+PROBE: NOT DETECTED
SODIUM BLDV-SCNC: 135 MMOL/L (ref 136–145)
SODIUM SERPL-SCNC: 136 MMOL/L (ref 136–145)
SP GR UR STRIP.AUTO: 1.02
SQUAMOUS #/AREA URNS AUTO: ABNORMAL /HPF
TOTAL CELLS COUNTED BLD: 116
TREPONEMA PALLIDUM IGG+IGM AB [PRESENCE] IN SERUM OR PLASMA BY IMMUNOASSAY: NONREACTIVE
UROBILINOGEN UR STRIP.AUTO-MCNC: 4 MG/DL
VARIANT LYMPHS # BLD MANUAL: 0.06 X10*3/UL (ref 0–0.5)
VARIANT LYMPHS NFR BLD: 0.9 %
WBC # BLD AUTO: 7.2 X10*3/UL (ref 4.4–11.3)
WBC #/AREA URNS AUTO: ABNORMAL /HPF

## 2024-02-01 PROCEDURE — 70450 CT HEAD/BRAIN W/O DYE: CPT

## 2024-02-01 PROCEDURE — 85007 BL SMEAR W/DIFF WBC COUNT: CPT | Performed by: STUDENT IN AN ORGANIZED HEALTH CARE EDUCATION/TRAINING PROGRAM

## 2024-02-01 PROCEDURE — 80307 DRUG TEST PRSMV CHEM ANLYZR: CPT | Performed by: STUDENT IN AN ORGANIZED HEALTH CARE EDUCATION/TRAINING PROGRAM

## 2024-02-01 PROCEDURE — 87389 HIV-1 AG W/HIV-1&-2 AB AG IA: CPT | Performed by: EMERGENCY MEDICINE

## 2024-02-01 PROCEDURE — 84484 ASSAY OF TROPONIN QUANT: CPT | Performed by: STUDENT IN AN ORGANIZED HEALTH CARE EDUCATION/TRAINING PROGRAM

## 2024-02-01 PROCEDURE — 82248 BILIRUBIN DIRECT: CPT | Performed by: STUDENT IN AN ORGANIZED HEALTH CARE EDUCATION/TRAINING PROGRAM

## 2024-02-01 PROCEDURE — 86780 TREPONEMA PALLIDUM: CPT | Performed by: EMERGENCY MEDICINE

## 2024-02-01 PROCEDURE — 87636 SARSCOV2 & INF A&B AMP PRB: CPT | Performed by: STUDENT IN AN ORGANIZED HEALTH CARE EDUCATION/TRAINING PROGRAM

## 2024-02-01 PROCEDURE — 80179 DRUG ASSAY SALICYLATE: CPT | Performed by: STUDENT IN AN ORGANIZED HEALTH CARE EDUCATION/TRAINING PROGRAM

## 2024-02-01 PROCEDURE — 36415 COLL VENOUS BLD VENIPUNCTURE: CPT | Performed by: STUDENT IN AN ORGANIZED HEALTH CARE EDUCATION/TRAINING PROGRAM

## 2024-02-01 PROCEDURE — 36415 COLL VENOUS BLD VENIPUNCTURE: CPT | Performed by: EMERGENCY MEDICINE

## 2024-02-01 PROCEDURE — 84132 ASSAY OF SERUM POTASSIUM: CPT | Mod: 59 | Performed by: STUDENT IN AN ORGANIZED HEALTH CARE EDUCATION/TRAINING PROGRAM

## 2024-02-01 PROCEDURE — 70450 CT HEAD/BRAIN W/O DYE: CPT | Performed by: RADIOLOGY

## 2024-02-01 PROCEDURE — 93005 ELECTROCARDIOGRAM TRACING: CPT

## 2024-02-01 PROCEDURE — 82435 ASSAY OF BLOOD CHLORIDE: CPT | Mod: 59 | Performed by: STUDENT IN AN ORGANIZED HEALTH CARE EDUCATION/TRAINING PROGRAM

## 2024-02-01 PROCEDURE — 96360 HYDRATION IV INFUSION INIT: CPT

## 2024-02-01 PROCEDURE — 83735 ASSAY OF MAGNESIUM: CPT | Performed by: STUDENT IN AN ORGANIZED HEALTH CARE EDUCATION/TRAINING PROGRAM

## 2024-02-01 PROCEDURE — 96372 THER/PROPH/DIAG INJ SC/IM: CPT

## 2024-02-01 PROCEDURE — 85027 COMPLETE CBC AUTOMATED: CPT | Performed by: STUDENT IN AN ORGANIZED HEALTH CARE EDUCATION/TRAINING PROGRAM

## 2024-02-01 PROCEDURE — 93010 ELECTROCARDIOGRAM REPORT: CPT | Performed by: EMERGENCY MEDICINE

## 2024-02-01 PROCEDURE — 2500000004 HC RX 250 GENERAL PHARMACY W/ HCPCS (ALT 636 FOR OP/ED): Mod: SE | Performed by: EMERGENCY MEDICINE

## 2024-02-01 PROCEDURE — 99285 EMERGENCY DEPT VISIT HI MDM: CPT | Performed by: EMERGENCY MEDICINE

## 2024-02-01 PROCEDURE — 2500000004 HC RX 250 GENERAL PHARMACY W/ HCPCS (ALT 636 FOR OP/ED): Mod: SE | Performed by: STUDENT IN AN ORGANIZED HEALTH CARE EDUCATION/TRAINING PROGRAM

## 2024-02-01 PROCEDURE — 87086 URINE CULTURE/COLONY COUNT: CPT | Performed by: STUDENT IN AN ORGANIZED HEALTH CARE EDUCATION/TRAINING PROGRAM

## 2024-02-01 PROCEDURE — 71045 X-RAY EXAM CHEST 1 VIEW: CPT

## 2024-02-01 PROCEDURE — 81001 URINALYSIS AUTO W/SCOPE: CPT | Mod: 59 | Performed by: STUDENT IN AN ORGANIZED HEALTH CARE EDUCATION/TRAINING PROGRAM

## 2024-02-01 PROCEDURE — 71045 X-RAY EXAM CHEST 1 VIEW: CPT | Performed by: INTERNAL MEDICINE

## 2024-02-01 PROCEDURE — 84702 CHORIONIC GONADOTROPIN TEST: CPT | Performed by: STUDENT IN AN ORGANIZED HEALTH CARE EDUCATION/TRAINING PROGRAM

## 2024-02-01 PROCEDURE — 86901 BLOOD TYPING SEROLOGIC RH(D): CPT | Performed by: STUDENT IN AN ORGANIZED HEALTH CARE EDUCATION/TRAINING PROGRAM

## 2024-02-01 PROCEDURE — 82140 ASSAY OF AMMONIA: CPT | Performed by: STUDENT IN AN ORGANIZED HEALTH CARE EDUCATION/TRAINING PROGRAM

## 2024-02-01 RX ORDER — OLANZAPINE 10 MG/2ML
10 INJECTION, POWDER, FOR SOLUTION INTRAMUSCULAR ONCE
Status: COMPLETED | OUTPATIENT
Start: 2024-02-01 | End: 2024-02-01

## 2024-02-01 RX ORDER — ZIPRASIDONE MESYLATE 20 MG/ML
20 INJECTION, POWDER, LYOPHILIZED, FOR SOLUTION INTRAMUSCULAR ONCE
Status: DISCONTINUED | OUTPATIENT
Start: 2024-02-01 | End: 2024-02-02 | Stop reason: HOSPADM

## 2024-02-01 RX ORDER — OLANZAPINE 10 MG/2ML
INJECTION, POWDER, FOR SOLUTION INTRAMUSCULAR
Status: DISPENSED
Start: 2024-02-01 | End: 2024-02-02

## 2024-02-01 RX ADMIN — OLANZAPINE 10 MG: 10 INJECTION, POWDER, FOR SOLUTION INTRAMUSCULAR at 17:29

## 2024-02-01 RX ADMIN — SODIUM CHLORIDE, POTASSIUM CHLORIDE, SODIUM LACTATE AND CALCIUM CHLORIDE 1000 ML: 600; 310; 30; 20 INJECTION, SOLUTION INTRAVENOUS at 12:41

## 2024-02-01 SDOH — ECONOMIC STABILITY: HOUSING INSECURITY: FEELS SAFE LIVING IN HOME: YES

## 2024-02-01 SDOH — HEALTH STABILITY: MENTAL HEALTH: WISH TO BE DEAD (PAST 1 MONTH): YES

## 2024-02-01 SDOH — HEALTH STABILITY: MENTAL HEALTH

## 2024-02-01 SDOH — HEALTH STABILITY: MENTAL HEALTH: SUICIDAL BEHAVIOR (LIFETIME): NO

## 2024-02-01 SDOH — HEALTH STABILITY: MENTAL HEALTH: NON-SPECIFIC ACTIVE SUICIDAL THOUGHTS (PAST 1 MONTH): NO

## 2024-02-01 SDOH — HEALTH STABILITY: MENTAL HEALTH: DEPRESSION SYMPTOMS: ISOLATIVE;APPETITE CHANGE;CHANGE IN ENERGY LEVEL

## 2024-02-01 ASSESSMENT — PAIN SCALES - GENERAL: PAINLEVEL_OUTOF10: 0 - NO PAIN

## 2024-02-01 ASSESSMENT — LIFESTYLE VARIABLES
EVER FELT BAD OR GUILTY ABOUT YOUR DRINKING: NO
HAVE PEOPLE ANNOYED YOU BY CRITICIZING YOUR DRINKING: NO
SUBSTANCE_ABUSE_PAST_12_MONTHS: YES
HAVE YOU EVER FELT YOU SHOULD CUT DOWN ON YOUR DRINKING: NO
PRESCIPTION_ABUSE_PAST_12_MONTHS: NO
EVER HAD A DRINK FIRST THING IN THE MORNING TO STEADY YOUR NERVES TO GET RID OF A HANGOVER: NO

## 2024-02-01 ASSESSMENT — COLUMBIA-SUICIDE SEVERITY RATING SCALE - C-SSRS
1. IN THE PAST MONTH, HAVE YOU WISHED YOU WERE DEAD OR WISHED YOU COULD GO TO SLEEP AND NOT WAKE UP?: NO
6. HAVE YOU EVER DONE ANYTHING, STARTED TO DO ANYTHING, OR PREPARED TO DO ANYTHING TO END YOUR LIFE?: NO
2. HAVE YOU ACTUALLY HAD ANY THOUGHTS OF KILLING YOURSELF?: NO

## 2024-02-01 ASSESSMENT — PAIN DESCRIPTION - PROGRESSION: CLINICAL_PROGRESSION: NOT CHANGED

## 2024-02-01 ASSESSMENT — PAIN - FUNCTIONAL ASSESSMENT: PAIN_FUNCTIONAL_ASSESSMENT: 0-10

## 2024-02-01 NOTE — Clinical Note
Patricia Vieyra was seen and treated in our emergency department on 2/1/2024.  She may return to work on 02/05/2024.       If you have any questions or concerns, please don't hesitate to call.      Raj Liu MD

## 2024-02-01 NOTE — ED TRIAGE NOTES
Pt arrives via [facility] for AMS. The pt is normally AOX4 but is currently AOX1 and unable to answer questions. The daughter states that she hasn't heard from her in a few days and found the patient to be altered when she went over today

## 2024-02-01 NOTE — ED PROVIDER NOTES
"HPI:  Patient is a 43-year-old female with a history of insulin-dependent diabetes, PTSD, bipolar 2 disorder, hypertension, hyperlipidemia who presents with her daughter with altered mental status.  Patient's daughter lives with her in addition to her stepfather and the patient has custody of her 3 young grandchildren.  She states for the past 4 days she has had decreased p.o. intake and is minimally verbal.  Patient was sitting in the chair today with a \"blank stare\" which prompted family to call and this.  Patient is able to tell me her name but is otherwise somewhat catatonic.    ROS: unable to assess, pt nonverbal    PMH/PSH: Reviewed in EMR. As above in HPI.  SH: Pt's daughter states the pt smokes 2PPD. Smokes marijuana daily. No EtOH or illicit drug use.  Allergies:   Allergies   Allergen Reactions    Bee Sting Kit Swelling    Coconut Hives    Mushroom Hives      Medications: See prescription writer for full medication list.     General: middle-aged Black female in no acute distress, minimally verbal, poor hygiene  HEENT:  No rhinorrhea. MMM. Moist tongue. PERRL. Pt not compliant with EOM examination.   Cardiac: regular rate rhythm, no murmurs  Pulm:  normal respiratory effort on room air, equal chest expansion, clear bilaterally, no wheeze or crackles  GI: soft, nontender, nondistended, +BS  Extremities:  moves all extremities freely, no edema noted  Skin: warm, well-perfused, no lesions noted on exposed skin. No evidence of trauma.   Neuro:  Awake, will only state first name, moves all 4 extremities freely and independently. Not compliant with CN examination. No facial droop. Does not answer with sensation questioning to light touch in all 4 extremities distally. Appropriate and symmetric  strength bilaterally. Appropriate and symmetric plantar/dorsiflexion with resistance bilaterally. Normal gait.     DDX: Includes but not limited to ICH versus seizure versus dehydration versus UTI versus acute " "psychosis versus hyperammonemia versus other    Assessment/Plan/MDM  Patient is a 43-year-old female with a history of insulin-dependent diabetes, PTSD, bipolar 2 disorder, hypertension, hyperlipidemia who presents with her daughter with altered mental status. CT head negative for intracranial hemorrhage or mass effect.  Chest x-ray negative for focal infiltrate.  UA without evidence of UTI.  There is no leukocytosis, hemoglobin is stable.  No electrolyte derangements.  Patient is not pregnant.  Ammonia within normal limits.  Initial troponin negative.  Lactate within normal limits.  COVID/flu negative.  On reassessment, patient inappropriately laughing and remains altered.  She may be encephalopathic secondary to possible meningitis, considering lumbar puncture however patient is afebrile without hypotensive, full range of motion to the neck.  This additionally could be a manic episode. On reassessment, pt with normal gait, yelling at bedside RN, ripped her IV out. Intermittently tearful but then inappropriately laughing. Pt initially verbally redirectable however slamming her room door, refusing to get into bed. Pt unable to articulate why she is upset, repeatedly stating \"I keep calling you and waiting for you.\" Pt requiring IM zyprexa 10mg. EPAT consultation placed. Patient signed out in stable condition pending further workup.    EKG shows sinus tachycardia, rate 101, normal axis, normal parables, normal QTc, no ST elevation, grossly unchanged from prior (July 2023).    ED Course/Progress:    Diagnoses as of 02/02/24 1409   Behavior concern        Clinical Impression: as above  Dispo: deferred to Dr. Murillo    Pt seen and discussed with attending physician, Dr. Farhad Hart MD  PGY3, Emergency Medicine    Disclaimer: This note was dictated by speech recognition. An attempt at proof reading was made to minimize errors. Errors in transcription may be present.  Please call if questions.        Kika Hart, " MD  Resident  02/02/24 4593

## 2024-02-02 VITALS
WEIGHT: 170 LBS | SYSTOLIC BLOOD PRESSURE: 156 MMHG | OXYGEN SATURATION: 99 % | HEART RATE: 101 BPM | BODY MASS INDEX: 27.32 KG/M2 | RESPIRATION RATE: 16 BRPM | HEIGHT: 66 IN | TEMPERATURE: 97.7 F | DIASTOLIC BLOOD PRESSURE: 99 MMHG

## 2024-02-02 LAB
ATRIAL RATE: 101 BPM
BACTERIA UR CULT: NO GROWTH
P AXIS: 68 DEGREES
P OFFSET: 177 MS
P ONSET: 132 MS
PR INTERVAL: 156 MS
Q ONSET: 210 MS
QRS COUNT: 17 BEATS
QRS DURATION: 78 MS
QT INTERVAL: 334 MS
QTC CALCULATION(BAZETT): 433 MS
QTC FREDERICIA: 397 MS
R AXIS: 42 DEGREES
T AXIS: 64 DEGREES
T OFFSET: 377 MS
VENTRICULAR RATE: 101 BPM

## 2024-02-02 PROCEDURE — 2500000004 HC RX 250 GENERAL PHARMACY W/ HCPCS (ALT 636 FOR OP/ED): Mod: SE | Performed by: STUDENT IN AN ORGANIZED HEALTH CARE EDUCATION/TRAINING PROGRAM

## 2024-02-02 PROCEDURE — 96360 HYDRATION IV INFUSION INIT: CPT | Mod: 59

## 2024-02-02 PROCEDURE — 96361 HYDRATE IV INFUSION ADD-ON: CPT

## 2024-02-02 RX ORDER — LANOLIN ALCOHOL/MO/W.PET/CERES
400 CREAM (GRAM) TOPICAL ONCE
Status: COMPLETED | OUTPATIENT
Start: 2024-02-02 | End: 2024-02-02

## 2024-02-02 RX ADMIN — SODIUM CHLORIDE, POTASSIUM CHLORIDE, SODIUM LACTATE AND CALCIUM CHLORIDE 1000 ML: 600; 310; 30; 20 INJECTION, SOLUTION INTRAVENOUS at 09:19

## 2024-02-02 RX ADMIN — MAGNESIUM OXIDE TAB 400 MG (241.3 MG ELEMENTAL MG) 400 MG: 400 (241.3 MG) TAB at 07:35

## 2024-02-02 NOTE — PROGRESS NOTES
EPAT - Social Work Psychiatric Assessment    Arrival Details  Mode of Arrival: Ambulance  Admission Source: Home  Admission Type: Involuntary  EPAT Assessment Start Date: 24  EPAT Assessment Start Time:   Name of : MOODY Medellin LSW    History of Present Illness  Admission Reason: AMS  HPI:     Patient is a 44yo female presenting to the ED via EMS with chief complaint of AMS. Reportedly, “the pt is normally AOX4 but is currently AOX1 and unable to answer questions. The daughter states that she hasn't heard from her in a few days and found the patient to be altered when she went over today”. Patient's chart, triage, and provider note reviewed prior to assessment. Patient has a psychiatric history of Bipolar II D/O and PTSD, endorses previous inpatient admissions. Patient appears to be active with outpatient services through [facility] and had her last appointment on 24. At this visit it was noted that patient “still wants counseling. Nightmares and voices 'keep hearing them, overwhelming, I lock myself in my room'. Ongoing problems with pain. Fears falling, keeps to herself. […] Hx passive SI, 'strong lately'. 3-4 days per week, 'just so depressed'. Psychosis: reports voices of her  brother. Sleep: also not sleeping, reports nightmares”. Patient's history limited due to current presentation. No risk indicated at triage, UTOX and BAL not available.     SW Readmission Information   Readmission within 30 Days: No    Psychiatric Symptoms  Anxiety Symptoms:  (unable to assess)  Depression Symptoms: Isolative, Appetite change, Change in energy level  [place]anira Symptoms: No problems reported or observed.    Psychosis Symptoms  Hallucination Type: Auditory  Delusion Type:  (unable to assess)    Additional Symptoms - Adult  Generalized Anxiety Disorder:  (unable to assess)  Obsessive Compulsive Disorder:  (pt's daughter reports hx of OCD)  Panic Attack: No problems reported or observed.  Post  Traumatic Stress Disorder: Traumatic event, Persistent symptoms of increased arousal, Exaggerated startle response  Delirium: Disorientation, Waxing and waning    Past Psychiatric History/Meds/Treatments  Past Psychiatric History: Psychiatric Diagnosis: Bipolar II D/O, PTSD // Current MH Center: [facility] // Previous Admissions: pt endorses but cannot recall when/where // History of self-harm/SA: denies  Past Psychiatric Meds/Treatments: Current medication: DULoxetine (CYMBALTA) 60 mg DR capsule    Take 1 Capsule by mouth 2 (two) times daily 60 Capsule; traZODone (DESYREL) 50 mg tablet  Take 0.5 Tablets by mouth nightly at bedtime; lamoTRIgine (LAMICTAL) 200 mg tablet   Indications: Bipolar II disorder (HCC-CMS) Take 1 Tablet by mouth once daily; melatonin 10 mg cap   Indications: PTSD (post-traumatic stress disorder) Take 1 Capsule by mouth nightly at bedtime; prazosin (MINIPRESS) 2 mg capsule   Indications: PTSD (post-traumatic stress disorder) Take 2 Capsules by mouth nightly at bedtime  Past Violence/Victimization History: unable to assess    Current Mental Health Contacts   Name/Phone Number: none   Last Appointment Date: none  Provider Name/Phone Number: Arnaud Mcconnell [facility]  Provider Last Appointment Date: 1/24/24    Support System: Immediate family, Community    Living Arrangement: House, Lives with someone    Home Safety  Feels Safe Living in Home: Yes    Income Information  Employment Status for: Patient  Employment Status: Unemployed (per outpatient notes, waiting on disability)  Income Source: Family    [place] Service/Education History  Current or Previous  Service: None  Education Level:  (unable to assess)  History of Learning Problems: No  History of School Behavior Problems: No    Social/Cultural History  Social History: [place] Citizen: yes // Payee: none // guardian/POA: self  Cultural Requests During Hospitalization: none  Spiritual Requests During  Hospitalization: none  Important Activities: Family    Legal  Criminal Activity/ Legal Involvement Pertinent to Current Situation/ Hospitalization: None  Legal Concerns: None noted    Drug Screening  Have you used any substances (canabis, cocaine, heroin, hallucinogens, inhalants, etc.) in the past 12 months?: Yes  Have you used any prescription drugs other than prescribed in the past 12 months?: No  Is a toxicology screen needed?: Yes    Stage of Change  Stage of Change: Precontemplation  History of Treatment:  (unable to assess)  Type of Treatment Offered: Inpatient (Psych)  Treatment Offered:  (n/a)  Duration of Substance Use: unable to assess  Frequency of Substance Use: per outpatient documentation, hx of cannabis  Age of First Substance Use: unable to assess    Psychosocial  Psychosocial (WDL): Exceptions to WDL  Behaviors/Mood: Flat affect, Nonverbal, Not interactive  Affect: Appropriate to circumstances  Parent/Guardian/Significant Other Involvement: Attentive to patient needs  Family Behaviors: Appropriate for situation    Orientation  Orientation Level: Disoriented to place, Disoriented to time, Disoriented to situation    General Appearance  Motor Activity: Unremarkable  Speech Pattern: Mute  General Attitude: Uncooperative, Withdrawn  Appearance/Hygiene: Disheveled    Thought Process  Coherency: Unable to assess  Content: Unable to assess  Delusions:  (Unable to assess)  Perception: Unable to assess  Hallucination: Unable to assess  Judgment/Insight: Impaired  Confusion: Severe  Cognition: Follows commands    Sleep Pattern  Sleep Pattern: Insomnia    Risk Factors  Self Harm/Suicidal Ideation Plan: Denies  Previous Self Harm/Suicidal Plans: denies  Risk Factors: Major mental illness, Unemployment    Violence Risk Assessment  Assessment of Violence: None noted  Thoughts of Harm to Others: No    Ability to Assess Risk Screen  Risk Screen - Ability to Assess: Able to be screened  [place] Suicide Severity  Rating Scale (Screener/Recent Self-Report)  1. Wish to be Dead (Past 1 Month): Yes  2. Non-Specific Active Suicidal Thoughts (Past 1 Month): No  6. Suicidal Behavior (Lifetime): No  Calculated C-SSRS Risk Score (Lifetime/Recent): Low Risk  Step 1: Risk Factors  Current & Past Psychiatric Dx: Mood disorder, PTSD  Presenting Symptoms: Anhedonia, Insomia  Precipitants/Stressors: Triggering events leading to humiliation, shame, and/or despair (e.g. loss of relationship, financial or health status) (real or anticipated)  Change in Treatment: Hopeless or dissatisfied with provider or treatment  Access to Lethal Methods : No  Step 2: Protective Factors   Protective Factors Internal: Fear of death or the actual act of killing self, Identifies reasons for living  Protective Factors External: Responsibility to children, Supportive social network or family or friends  Step 3: Suicidal Ideation Intensity  Most Severe Suicidal Ideation Identified: chronic passive SI  How Many Times Have You Had These Thoughts: 2-5 times in a week  When You Have the Thoughts How Long do They Last : Less than 1 hour/some of the time  Could/Can You Stop Thinking About Killing Yourself or Wanting to Die if You Want to: Can control thoughts with little difficulty  Are There Things - Anyone or Anything - That Stopped You From Wanting to Die or Acting on: Deterrents definitely stopped you from attempting suicide  What Sort of Reasons Did You Have For Thinking About Wanting to Die or Killing Yourself: Equally to get attention, revenge or a reaction from others and to end/stop the pain  Total Score: 11  Step 5: Documentation  Risk Level: Low suicide risk (Patient remains low risk, discussed with Dr. Brennan)    Psychiatric Impression and Plan of Care  Assessment and Plan:     Upon assessment the patient was encountered sleeping and required some prompting to participate. When asked to identify herself, the patient started giggling to herself and then  abruptly stated “what!”. Patient responded to her first name, was able to identify her last name, but was unable to state her . This writer introduced herself and reason for interview, to which the patient responded “you got to be kidding me”. The patient continued to be A&Ox1, was unaware of the situation bringing her in and appeared alarmed to be in the hospital. Patient would often repeat the question asked and then trail off as if losing her train of thought, then ask “say that again?”. She ultimately denied SI/HI but did not appear to be processing the content of most interview items. Patient initially endorsed AH but then stated, “no, who said that?” when asked regarding content of hallucinations. The patient appeared to be frustrated with her lack of knowledge or ability to process, stating “I don't know man” and becoming tearful when not able to respond. She then began staring blankly at the floor and would not respond to her name or acknowledge this writer's voice.     This writer spoke with patient's daughter, Shannon Gomes. She reports the patient has not spoken in the past three days. She states patient has not been responding when they are trying to talk with her and they noticed a general decline in functioning the past few days. Patient states she lives in the home with her mother and helps care for her physical needs. Her brother had told her to contact EMS earlier today with concern the patient was having a stroke due to AMS. Pt is reportedly a one person assist due to difficulty ambulating secondary to pain in her legs/feet, uses a cane at home and requires assistance to shower. Pt's daughter endorses family history of Bipolar D/O but states the patient has always been compliant with medication. She expressed belief this presentation could be in the context of patient mourning the loss of her brother, stating the patient “had to pull the plug on him” when he passed.     Diagnostic Impression:  Bipolar D/O, r/o acute trauma or stress response     Specific Resources Provided to Patient: admission    Outcome/Disposition  Patient's Perception of Outcome Achieved: unable to assess  Assessment, Recommendations and Risk Level Reviewed with: Dr. Brennan  Contact Name: Shannon Gomes  Contact Number(s): [phone]  Contact Relationship: Daughter  EPAT Assessment Completed Date: 02/01/24  EPAT Assessment Completed Time: 2220

## 2024-02-02 NOTE — PROGRESS NOTES
Patient was handed off to me from the previous team. For full history, physical, and prior ED course, please see previous provider note prior to patient handoff. This is an addendum to the record.    Briefly, this is a 43-year-old female with history of type 1 diabetes and bipolar disorder who presents to the emergency department for catatonia and bizarre behavior.  Did become agitated from previous provider, received 10 mg IM Zyprexa.  Evaluated by EPAT, endorsing auditory hallucinations and passive suicidal ideation.  Recommending placement, pending EPAT to place at time of signout.    Hospital Course/MDM:  Patient remained calm and cooperative throughout my shift.  Did not require any additional as needed medications.  Pending EPAT to place at time of signout.    Disposition:  EPAT to place    --  Elva Law MD  Emergency Medicine, PGY-3

## 2024-02-02 NOTE — PROGRESS NOTES
"Patricia Vieyra is a 43 y.o. female on day 0 of admission presenting with No Principal Problem: There is no principal problem currently on the Problem List. Please update the Problem List and refresh..    Subjective   Patient reevaluated by me--no complaints,No symptoms, oriented x 3, discussion with patient and her daughter who is in the room with her with who is her caretaker ANO x 3-patientHave had attacks of psychosis secondary to marijuana intake yesterday but has not been here for over 27 hours patient does have a resting tachycardia of 100 but states she always is tachycardic-patient has a primary care doctor, has medications including diabetic medications and will follow-up       Objective     Physical Exam    Last Recorded Vitals  Blood pressure (!) 177/110, pulse (!) 109, temperature 36.5 °C (97.7 °F), temperature source Tympanic, resp. rate 16, height 1.676 m (5' 6\"), weight 77.1 kg (170 lb), SpO2 98 %.  Intake/Output last 3 Shifts:  No intake/output data recorded.    Relevant Results                             Assessment/Plan   Active Problems:  There are no active Hospital Problems.    Resolved psychosis       I spent 10 minutes in the professional and overall care of this patient.      Raj Liu MD      "

## 2024-02-02 NOTE — PROGRESS NOTES
Patient was handed off to me from the previous team. For full history, physical, and prior ED course, please see previous provider note prior to patient handoff. This is an addendum to the record.    Briefly, this is a 43-year-old female with history of bipolar disorder and PTSD who presents to the emergency department with bizarre behavior and concern for catatonia.  Evaluated by EPAT, recommending placement, pending EPAT to place at time of signout.    Hospital Course/MDM:  I reviewed the patient's lab work.  UDS positive for cannabinoids, otherwise labs are largely unremarkable.  Medically cleared, pending EPAT to place.    Disposition:  EPAT to place    --  Elva Law MD  Emergency Medicine, PGY-3

## 2024-02-02 NOTE — PROGRESS NOTES
I received Patricia Vieyra in signout from Dr. Elva Law.  Please see the previous note for all HPI, PE and MDM up to the time of signout at 0700.    In brief Patricia Vieyra is an 43 y.o. female presenting for   Chief Complaint   Patient presents with    Altered Mental Status   .  At the time of signout we were awaiting:    Patient is a 43-year-old female with a past medical history of insulin-dependent diabetes, PTSD, bipolar 2 disorder, hypertension, hyperlipidemia presenting to the emergency department with erratic behavior.  Was reportedly initially catatonic and then became significantly agitated requiring eventual 10 of IM Zyprexa.  See previous provider and EPAT notes for further details.   They were concerned for either decompensated bipolar or an acute trauma/stress response and concurred with previous assessment with recs for inpatient admission.  Patient's labs were reviewed and showed no evidence of UTI, ingestion of acetaminophen or salicylates, intoxicating substances, COVID/flu/syphilis/HIV positivity, or other gross metabolic derangement.  Did show a slight level of hypomagnesemia which I ordered oral repletion for.  Patient was otherwise stable.    However, on my reevaluation of the patient, she was alert and oriented x 4, linear thought processes.  Denied any suicidal ideation, homicidal ideation, hallucinations.  Did state that she had smoked marijuana somewhat recently and believes that was the etiology of her symptoms.  Does not appear clinically manic, is able to have full conversations.  States that her priorities are taking care of her grandchildren.  Patient's daughter is beside her at bedside and states that she thinks that her mom is in her normal state of health.  Family feels comfortable with managing the patient at home.  Patient did state that she would have close outpatient follow-up with psychiatry and was okay with outpatient management.  Although patient's U tox was negative,  cannot definitively rule out acute intoxication at the time of patient's agitation.  Currently, on my reassessment, patient is not meeting any inpatient psychiatric criteria and is able to contract for safety denying any active or ongoing suicidal ideation or homicidal ideation.  After an extensive risk-benefit conversation with the patient and her daughter at bedside, family strongly felt that patient was safe for discharge.  I did discuss the patient with EPAT who stated that was appropriate given patient's changes and took the patient off of the recommendation list for an inpatient psychiatric facility.  Patient's vitals were notable for some persistent mild tachycardia.  At discharge her rate was 101.  Patient was denying any ongoing medical symptoms including no chest pain, abdominal pain, nausea, vomiting, fever, chills, back pain.  Stated that she felt in her normal state of health and that she has a fast heart rate at baseline that they know whenever she goes to the doctor.  States that she is normally on medicines that she has not gone over the past 24 hours because she has been here that she believes would normally stop this from happening.  Feel that this is likely patient's baseline.  I did discuss return precautions for evidence of new severe or erratic behavior or decompensated psychiatric concerns both with the patient and her daughter at bedside.  Patient will subsequently be discharged in stable condition.    Pt Disposition: [unfilled]    Procedures

## 2024-02-16 DIAGNOSIS — Z86.73 RECENT CEREBROVASCULAR ACCIDENT (CVA): Primary | ICD-10-CM

## 2024-02-22 ENCOUNTER — TELEPHONE (OUTPATIENT)
Dept: PRIMARY CARE | Facility: CLINIC | Age: 44
End: 2024-02-22

## 2024-02-22 ENCOUNTER — APPOINTMENT (OUTPATIENT)
Dept: PRIMARY CARE | Facility: CLINIC | Age: 44
End: 2024-02-22
Payer: COMMERCIAL

## 2024-02-22 NOTE — TELEPHONE ENCOUNTER
Patient recently had a stroke and was discharged from the hospital I believe it was a nurse that requested for her to get an referral for speech therapy. Please give the patient a callback about this matter as soon as possible.

## 2024-03-05 ENCOUNTER — LAB (OUTPATIENT)
Dept: LAB | Facility: LAB | Age: 44
End: 2024-03-05
Payer: COMMERCIAL

## 2024-03-05 ENCOUNTER — HOME HEALTH ADMISSION (OUTPATIENT)
Dept: HOME HEALTH SERVICES | Facility: HOME HEALTH | Age: 44
End: 2024-03-05
Payer: COMMERCIAL

## 2024-03-05 ENCOUNTER — OFFICE VISIT (OUTPATIENT)
Dept: PRIMARY CARE | Facility: CLINIC | Age: 44
End: 2024-03-05
Payer: COMMERCIAL

## 2024-03-05 ENCOUNTER — DOCUMENTATION (OUTPATIENT)
Dept: HOME HEALTH SERVICES | Facility: HOME HEALTH | Age: 44
End: 2024-03-05

## 2024-03-05 VITALS
HEIGHT: 67 IN | OXYGEN SATURATION: 97 % | TEMPERATURE: 98.1 F | SYSTOLIC BLOOD PRESSURE: 122 MMHG | DIASTOLIC BLOOD PRESSURE: 83 MMHG | BODY MASS INDEX: 27 KG/M2 | HEART RATE: 98 BPM | WEIGHT: 172 LBS | RESPIRATION RATE: 16 BRPM

## 2024-03-05 DIAGNOSIS — E11.40 DIABETIC NEUROPATHY, PAINFUL (MULTI): ICD-10-CM

## 2024-03-05 DIAGNOSIS — Z91.81 AT RISK FOR FALLING: ICD-10-CM

## 2024-03-05 DIAGNOSIS — E11.69 TYPE 2 DIABETES MELLITUS WITH OTHER SPECIFIED COMPLICATION, WITH LONG-TERM CURRENT USE OF INSULIN (MULTI): ICD-10-CM

## 2024-03-05 DIAGNOSIS — E11.9 INSULIN DEPENDENT TYPE 2 DIABETES MELLITUS (MULTI): ICD-10-CM

## 2024-03-05 DIAGNOSIS — Z79.4 TYPE 2 DIABETES MELLITUS WITH OTHER SPECIFIED COMPLICATION, WITH LONG-TERM CURRENT USE OF INSULIN (MULTI): ICD-10-CM

## 2024-03-05 DIAGNOSIS — R32 INCONTINENCE IN FEMALE: ICD-10-CM

## 2024-03-05 DIAGNOSIS — R41.82 ALTERED MENTAL STATUS, UNSPECIFIED ALTERED MENTAL STATUS TYPE: ICD-10-CM

## 2024-03-05 DIAGNOSIS — R06.09 DYSPNEA ON EXERTION: Primary | ICD-10-CM

## 2024-03-05 DIAGNOSIS — Z79.4 INSULIN DEPENDENT TYPE 2 DIABETES MELLITUS (MULTI): ICD-10-CM

## 2024-03-05 DIAGNOSIS — E78.5 HYPERLIPIDEMIA, UNSPECIFIED HYPERLIPIDEMIA TYPE: ICD-10-CM

## 2024-03-05 LAB
EST. AVERAGE GLUCOSE BLD GHB EST-MCNC: 283 MG/DL
HBA1C MFR BLD: 11.5 %

## 2024-03-05 PROCEDURE — 99215 OFFICE O/P EST HI 40 MIN: CPT | Performed by: STUDENT IN AN ORGANIZED HEALTH CARE EDUCATION/TRAINING PROGRAM

## 2024-03-05 PROCEDURE — 4010F ACE/ARB THERAPY RXD/TAKEN: CPT | Performed by: STUDENT IN AN ORGANIZED HEALTH CARE EDUCATION/TRAINING PROGRAM

## 2024-03-05 PROCEDURE — 83036 HEMOGLOBIN GLYCOSYLATED A1C: CPT

## 2024-03-05 PROCEDURE — 3046F HEMOGLOBIN A1C LEVEL >9.0%: CPT | Performed by: STUDENT IN AN ORGANIZED HEALTH CARE EDUCATION/TRAINING PROGRAM

## 2024-03-05 PROCEDURE — 36415 COLL VENOUS BLD VENIPUNCTURE: CPT

## 2024-03-05 PROCEDURE — 3079F DIAST BP 80-89 MM HG: CPT | Performed by: STUDENT IN AN ORGANIZED HEALTH CARE EDUCATION/TRAINING PROGRAM

## 2024-03-05 PROCEDURE — 1036F TOBACCO NON-USER: CPT | Performed by: STUDENT IN AN ORGANIZED HEALTH CARE EDUCATION/TRAINING PROGRAM

## 2024-03-05 PROCEDURE — 3074F SYST BP LT 130 MM HG: CPT | Performed by: STUDENT IN AN ORGANIZED HEALTH CARE EDUCATION/TRAINING PROGRAM

## 2024-03-05 RX ORDER — DIAPER,BRIEF,ADULT, DISPOSABLE
1 EACH MISCELLANEOUS DAILY
Qty: 80 EACH | Refills: 11 | Status: SHIPPED | OUTPATIENT
Start: 2024-03-05

## 2024-03-05 RX ORDER — INSULIN GLARGINE 100 [IU]/ML
75 INJECTION, SOLUTION SUBCUTANEOUS NIGHTLY
Qty: 67.5 ML | Refills: 3 | Status: SHIPPED | OUTPATIENT
Start: 2024-03-05 | End: 2024-05-31 | Stop reason: SDUPTHER

## 2024-03-05 RX ORDER — DULAGLUTIDE 0.75 MG/.5ML
0.75 INJECTION, SOLUTION SUBCUTANEOUS
Qty: 2 ML | Refills: 2 | Status: SHIPPED | OUTPATIENT
Start: 2024-03-05 | End: 2024-05-31 | Stop reason: SDUPTHER

## 2024-03-05 RX ORDER — DEXTROSE 4 G
1 TABLET,CHEWABLE ORAL 3 TIMES DAILY
Qty: 1 EACH | Refills: 0 | Status: SHIPPED | OUTPATIENT
Start: 2024-03-05 | End: 2024-03-08 | Stop reason: SDUPTHER

## 2024-03-05 RX ORDER — DIAPER,BRIEF,ADULT, DISPOSABLE
1 EACH MISCELLANEOUS DAILY
Qty: 80 EACH | Refills: 11 | Status: SHIPPED | OUTPATIENT
Start: 2024-03-05 | End: 2024-03-05 | Stop reason: SDUPTHER

## 2024-03-05 RX ORDER — ATORVASTATIN CALCIUM 40 MG/1
40 TABLET, FILM COATED ORAL DAILY
Qty: 90 TABLET | Refills: 3 | Status: SHIPPED | OUTPATIENT
Start: 2024-03-05 | End: 2024-05-31 | Stop reason: SDUPTHER

## 2024-03-05 RX ORDER — LISINOPRIL 10 MG/1
10 TABLET ORAL DAILY
Qty: 90 TABLET | Refills: 3 | Status: SHIPPED | OUTPATIENT
Start: 2024-03-05 | End: 2024-05-31 | Stop reason: SDUPTHER

## 2024-03-05 RX ORDER — ASPIRIN 81 MG/1
81 TABLET ORAL DAILY
Qty: 30 TABLET | Refills: 11 | Status: SHIPPED | OUTPATIENT
Start: 2024-03-05 | End: 2024-05-31 | Stop reason: SDUPTHER

## 2024-03-05 ASSESSMENT — PAIN SCALES - GENERAL: PAINLEVEL: 10-WORST PAIN EVER

## 2024-03-05 NOTE — PROGRESS NOTES
"Subjective   Patient ID: Patricia Vieyra is a 43 y.o. female who presents for Follow-up and Med Refill.    HPI    #post ed visit follow up  Her son called the ambulance because she wasn't responding for the 3 days  She was not talking to anyone and was not eating only drinking   Per daughter she was just standing there and didn't say anything  They called 911   Daughter present and states patient was standing with a blank face  Per daughter patient was AA0 X 1 not oriented to time and place     #dme  Needs a standard walker with a seat  because she still falling   Needs a bedside commode due to difficult with ambulation   Needs a home health aide, unable to do her ADLS  She has trouble with bathing, dressing and cooking   Daughter is helping her out with ADLs but she also works fulltime     #sob  She gets out of breath after walking long distance   Endorses intermittent swelling in her hands and legs   Denies any chest pain    #bipolar disoder  She's psych outside of    Has an appt with psych today   She is currently on lamictal for her bipolar 2 disorder     #dm  -has Lantus 75 units at night  -takes trulicity once a week   -she is supposed to be using a sliding scale but does not have a blood sugar machine     Review of Systems  ROS negative except for above mentioned.      Objective   /83 (BP Location: Right arm, Patient Position: Sitting, BP Cuff Size: Adult)   Pulse 98   Temp 36.7 °C (98.1 °F) (Temporal)   Resp 16   Ht 1.702 m (5' 7\")   Wt 78 kg (172 lb)   SpO2 97%   BMI 26.94 kg/m²     Physical Exam  General: appears to be in no acute distress.   HEENT: Normocephalic, atraumatic.   Cardiac: Normal S1 and S2. No S3, S4, or murmurs.  Lungs: Clear to auscultation bilaterally.  MSK: Sitting comfortably in chair, moves upper and lower extremities spontaneously  Neuro: CN II-XII grossly intact but not individually tested  Psych: Oriented to person, place, and time.    Assessment/Plan   A 43 -year-old " female with past medical history of hypertension, bipolar disorder, uncontrolled DM2 complicated by neuropathy (leading to falls), glaucoma, bladder incontinence, and recurrent yeast infections presents for multiple complaints. Intermittent history taken from patients daughter who is present in the room.     Post ED Follow up  -ED documents/images/labs independently reviewed  Patient presented for AMS with an overall negative workup. She required IM zyprexa for agitation. EPAT consult was placed. Differentials include Manic episode 2/2 to bipolar disorder vs possible TIA due to negative CT head. Per patient and her daughter they received a phone call the day after the ed visit and were told that the patient had stroke. There is no documentation of this phone call per chart review.   -will start patient on Aspirin, she is already on a high intensity statin due to concern for possible TIA  -Carotid artery b/l US ordered for further eval     Dyspnea on exertion  Acute, complicated     Relevant Orders    Transthoracic Echo (TTE) Complete ordered for further evaluation     At risk for falling      Likely 2/2 to hx of diabetic neuropathy vs intermittent manic episodes vs concern for TIA     Relevant Medications    Patient needs bedside commode     Referral to Home Care for PT/OT/Home health aide  Patient unable to do her ADLs due to chronic medical conditions listed.     Walker with Seat ordered today for ambulation assistance     Type 2 Diabetes complicated by neuropathy  Insulin dependent  Chronic, uncontrolled     Relevant Medications    blood-glucose meter ([employer] Blood Glucose Monitor) misc ordered for patient     Other Relevant Orders    Referral to Home Care    Hemoglobin A1c ordered today for further management     Continue with Lantus 75 units nightly and Trulicity once a week  Refilled today     Hypertension  Chronic, controlled     Relevant Medications    Continue with lisinopril 10 mg tablet once  daily  Refilled today     Hyperlipidemia, unspecified hyperlipidemia type        Relevant Medications    atorvastatin (Lipitor) 40 mg tablet once daily    Incontinence in female        Relevant Medications    diaper,brief,adult,disposable ([employer]wear [employer]) misc    miscellaneous medical supply misc    Other Relevant Orders    Walker with Seat        RTC in 1 month for a follow up on chronic conditions    Patient seen and discussed with attending, Dr. Last Solorio MD  Family Medicine, PGY-3

## 2024-03-05 NOTE — HH CARE COORDINATION
Home Care received a Referral for Physical Therapy, Occupational Therapy, and Home Health Aide. We have processed the referral for a Start of Care on 3.7.2024.     If you have any questions or concerns, please feel free to contact us at [phone]. Follow the prompts, enter your five digit zip code, and you will be directed to your care team on [facility].

## 2024-03-06 NOTE — PROGRESS NOTES
I saw and evaluated the patient. I personally obtained the key and critical portions of the history and physical exam or was physically present for key and critical portions performed by the resident/fellow. I reviewed the resident/fellow's documentation and discussed the patient with the resident/fellow. I agree with the resident/fellow's medical decision making as documented in the note.  F/U for recent ED visit for ?catatonia at home and apparent ?manic behavior in ED.  No evidence of stroke on CT scan but reported call from ED that she had suffered a stroke. Has f/u with psych- would ask if recently started Chantix could have contributed to mental status changes.  Unsteadiness and fall at home-> need home PT/OT eval and a number of DME articles to help ensure safety at home.    Gaby Ni MD

## 2024-03-08 DIAGNOSIS — E11.40 DIABETIC NEUROPATHY, PAINFUL (MULTI): ICD-10-CM

## 2024-03-08 DIAGNOSIS — E11.9 INSULIN DEPENDENT TYPE 2 DIABETES MELLITUS (MULTI): ICD-10-CM

## 2024-03-08 DIAGNOSIS — Z79.4 INSULIN DEPENDENT TYPE 2 DIABETES MELLITUS (MULTI): ICD-10-CM

## 2024-03-08 DIAGNOSIS — E11.69 TYPE 2 DIABETES MELLITUS WITH OTHER SPECIFIED COMPLICATION, WITH LONG-TERM CURRENT USE OF INSULIN (MULTI): ICD-10-CM

## 2024-03-08 DIAGNOSIS — Z79.4 TYPE 2 DIABETES MELLITUS WITH OTHER SPECIFIED COMPLICATION, WITH LONG-TERM CURRENT USE OF INSULIN (MULTI): ICD-10-CM

## 2024-03-08 RX ORDER — DEXTROSE 4 G
1 TABLET,CHEWABLE ORAL 3 TIMES DAILY
Qty: 1 EACH | Refills: 0 | Status: SHIPPED | OUTPATIENT
Start: 2024-03-08 | End: 2024-03-08 | Stop reason: SDUPTHER

## 2024-03-08 RX ORDER — DEXTROSE 4 G
1 TABLET,CHEWABLE ORAL 3 TIMES DAILY
Qty: 1 EACH | Refills: 0 | Status: SHIPPED | OUTPATIENT
Start: 2024-03-08

## 2024-03-20 DIAGNOSIS — E11.69 TYPE 2 DIABETES MELLITUS WITH OTHER SPECIFIED COMPLICATION, WITH LONG-TERM CURRENT USE OF INSULIN (MULTI): ICD-10-CM

## 2024-03-20 DIAGNOSIS — Z79.4 TYPE 2 DIABETES MELLITUS WITH OTHER SPECIFIED COMPLICATION, WITH LONG-TERM CURRENT USE OF INSULIN (MULTI): ICD-10-CM

## 2024-03-22 ENCOUNTER — APPOINTMENT (OUTPATIENT)
Dept: PRIMARY CARE | Facility: CLINIC | Age: 44
End: 2024-03-22
Payer: COMMERCIAL

## 2024-03-22 RX ORDER — BLOOD-GLUCOSE CONTROL, NORMAL
EACH MISCELLANEOUS
Qty: 204 EACH | Refills: 3 | Status: SHIPPED | OUTPATIENT
Start: 2024-03-22 | End: 2024-05-31 | Stop reason: SDUPTHER

## 2024-03-26 PROBLEM — E11.42 DIABETIC POLYNEUROPATHY ASSOCIATED WITH TYPE 2 DIABETES MELLITUS (MULTI): Status: ACTIVE | Noted: 2024-03-26

## 2024-03-28 ENCOUNTER — DOCUMENTATION (OUTPATIENT)
Dept: SPEECH THERAPY | Facility: CLINIC | Age: 44
End: 2024-03-28

## 2024-03-28 NOTE — PROGRESS NOTES
Speech-Language Pathology                 Therapy Communication Note    Patient Name: Patricia Vieyra  MRN: [id number]  Today's Date: 3/28/2024     Discipline: Speech Language Pathology    Missed Visit Reason: Patient was a no show/no call to her scheduled Speech Therapy evaluation this date.    Missed Time: No Show

## 2024-04-25 ENCOUNTER — OFFICE VISIT (OUTPATIENT)
Dept: PRIMARY CARE | Facility: CLINIC | Age: 44
End: 2024-04-25
Payer: COMMERCIAL

## 2024-04-25 VITALS
HEART RATE: 104 BPM | SYSTOLIC BLOOD PRESSURE: 114 MMHG | WEIGHT: 178 LBS | BODY MASS INDEX: 27.94 KG/M2 | TEMPERATURE: 97.2 F | HEIGHT: 67 IN | DIASTOLIC BLOOD PRESSURE: 80 MMHG | OXYGEN SATURATION: 97 %

## 2024-04-25 DIAGNOSIS — B37.9 YEAST INFECTION: Primary | ICD-10-CM

## 2024-04-25 DIAGNOSIS — Z79.4 INSULIN DEPENDENT TYPE 2 DIABETES MELLITUS (MULTI): ICD-10-CM

## 2024-04-25 DIAGNOSIS — E11.9 INSULIN DEPENDENT TYPE 2 DIABETES MELLITUS (MULTI): ICD-10-CM

## 2024-04-25 PROBLEM — G60.9 IDIOPATHIC PERIPHERAL NEUROPATHY: Status: ACTIVE | Noted: 2024-04-25

## 2024-04-25 PROCEDURE — 3079F DIAST BP 80-89 MM HG: CPT

## 2024-04-25 PROCEDURE — 4010F ACE/ARB THERAPY RXD/TAKEN: CPT

## 2024-04-25 PROCEDURE — 3046F HEMOGLOBIN A1C LEVEL >9.0%: CPT

## 2024-04-25 PROCEDURE — 3074F SYST BP LT 130 MM HG: CPT

## 2024-04-25 PROCEDURE — 99214 OFFICE O/P EST MOD 30 MIN: CPT

## 2024-04-25 RX ORDER — FLUCONAZOLE 150 MG/1
150 TABLET ORAL DAILY
Qty: 2 TABLET | Refills: 0 | Status: SHIPPED | OUTPATIENT
Start: 2024-04-25 | End: 2024-05-31 | Stop reason: SDUPTHER

## 2024-04-25 ASSESSMENT — PATIENT HEALTH QUESTIONNAIRE - PHQ9
1. LITTLE INTEREST OR PLEASURE IN DOING THINGS: NEARLY EVERY DAY
2. FEELING DOWN, DEPRESSED OR HOPELESS: MORE THAN HALF THE DAYS
SUM OF ALL RESPONSES TO PHQ9 QUESTIONS 1 AND 2: 5

## 2024-04-25 ASSESSMENT — PAIN SCALES - GENERAL: PAINLEVEL: 9

## 2024-04-25 ASSESSMENT — ENCOUNTER SYMPTOMS
OCCASIONAL FEELINGS OF UNSTEADINESS: 1
LOSS OF SENSATION IN FEET: 1
DEPRESSION: 1

## 2024-04-25 NOTE — PATIENT INSTRUCTIONS
It was so great to see you today Patricia Vieyra  . Today we discussed:    -Schedule to schedule with Endocrinology  -Sent medications to pharmacy  - Provided home health order    Call [phone]  For Care if you need to schedule appointment with specialist or images.  IF any labs were ordered today, I will CALL if labs are ABNORMAL. If labs are NORMAL then I will comment on MyChart and mail results to you.    Follow up in       You were see by:    Dr. Tiff Peraza D.O.  [facility]   Phone: [phone]

## 2024-04-25 NOTE — PROGRESS NOTES
"Subjective   Patient ID: Patricia Vieyra is a 43 y.o. female who presents for Follow-up (' I'm always in pain' I feel light headed').    HPI     Diabetes  Lab Results   Component Value Date    HGBA1C 11.5 (H) 03/05/2024     No results found for: \"LDLCALC\"  The ASCVD Risk score (Keri BOSS, et al., 2019) failed to calculate for the following reasons:    The valid total cholesterol range is 130 to 320 mg/dL  Lab Results   Component Value Date    CREATININE 0.93 02/01/2024     - medications: Lantus 75 units at night, trulicity every Saturday  - missing any doses: denies  - takes BS?: 300-400, check 4x daily. 320 this AM  - highest sugar: 444  - any hypoglycemia (sx or <70): denies  - on statin: yes  - on ACEi/ARB: yes    Checks sugars 1 hour after meals and they are elevated usually to 300-400. If she takes it fasting, blood glucose is usually 120. She has been referred to endocrinology but has not seen them. Patient reports no barriers to care. She states she has transportation, resources. In regard to weakness, patient states that for months she has been very weak at home. She is unable to get to the bathroom in time requiring use of depends. She is frequently incontinent of stool and urine. She also reports bilateral leg weakness and pain which is constant and limits her mobility. She uses a walker at home and has help from family to get around. She reports recent difficulty with getting her home health set up because she does not have a . Her daughter is a home health aide and is working on getting her mom on her schedule, but has had trouble. Patient also requesting refills and states she has another yeast infection.      PMH: T2DM (HbA1c 11/5 on 3/5/2024) with diabetic neuropathy, PTSD, Bipolar Disorder   Medications: ASA 81 QD, Lisinopril 10 mg QD, Atorvastatin 40 mg QD, Gabapentin 800 mg QD, insulin glargine 75u at bedtime, 0.75 mg dulaglutide weekly, Cymbalta QD (prescribed outside of  system, " "patient unsure of dose), Lamictal QD (prescribed outside of [facility] system, patient unsure of dose).   Social history: Patient reports smoking cigarettes, marijuana. Denies alcohol, recreational drug use. HPI: Patient is a 43 year old female who presents for diabetes follow up and weakness. In regard to her diabetes, she states that she uses insulin glargine 75u at bedtime, 0.75 mg dulaglutide weekly.          Review of Systems  10pt reviewed negative unless listed in HPI    Objective   /80 (BP Location: Right arm, Patient Position: Sitting)   Pulse 104   Temp 36.2 °C (97.2 °F)   Ht 1.702 m (5' 7\")   Wt 80.7 kg (178 lb)   SpO2 97%   BMI 27.88 kg/m²     Physical Exam  Constitutional:       General: She is not in acute distress.     Appearance: She is not toxic-appearing.   HENT:      Head: Normocephalic and atraumatic.      Nose: Nose normal.      Mouth/Throat:      Mouth: Mucous membranes are moist.   Eyes:      Conjunctiva/sclera: Conjunctivae normal.   Cardiovascular:      Rate and Rhythm: Normal rate and regular rhythm.      Heart sounds: No murmur heard.  Pulmonary:      Effort: Pulmonary effort is normal. No respiratory distress.      Breath sounds: No wheezing or rhonchi.   Abdominal:      General: There is no distension.      Palpations: Abdomen is soft.      Tenderness: There is no abdominal tenderness.   Musculoskeletal:      Right lower leg: No edema.      Left lower leg: No edema.      Comments: Ambulating with rollator. 5/5 muscle strength in UE and 4/5 muscle strength LE. Sensations intact   Skin:     General: Skin is warm and dry.   Neurological:      General: No focal deficit present.      Mental Status: She is alert.   Psychiatric:         Mood and Affect: Mood normal.         Behavior: Behavior normal.         Assessment/Plan   Patricia Vieyra is a 43 y.o. female with uncontrolled DMT2 with peripheral neuropathy, hypertension, bipolar disorder, and glaucoma, and recurrent yeast infections. BG " "at home 300-400. Patient has not followed with Endocrinology even with multiple referrals. When asked to name barriers to prevent her from going to see Endo and she said \"none,\" she has reliable transportation and help from  as well as son.  Patient states she is agreeable to schedule an appointment with Endo. In the meantime, the plan to start mealtime short acting insulin. Will treat yeast infection and order [facility].   Diagnoses and all orders for this visit:  Yeast infection  -     fluconazole (Diflucan) 150 mg tablet; Take 1 tablet (150 mg) by mouth once daily. Take 1 tablet for a acute yeast infection  Insulin dependent type 2 diabetes mellitus (Multi)  -     Referral to Ophthalmology; Future  -     Referral to Home Care; Future  -     Referral to Endocrinology; Future  -     insulin lispro (HumaLOG Tempo Pen) 100 unit/mL injection; Inject 5 Units under the skin 3 times a day with meals.  Other orders  -     Follow Up In Primary Care; Future     Discussed with Trent Peraza,   PGY2  "

## 2024-04-26 ENCOUNTER — DOCUMENTATION (OUTPATIENT)
Dept: HOME HEALTH SERVICES | Facility: HOME HEALTH | Age: 44
End: 2024-04-26
Payer: COMMERCIAL

## 2024-04-26 ENCOUNTER — TELEPHONE (OUTPATIENT)
Dept: HOME HEALTH SERVICES | Facility: HOME HEALTH | Age: 44
End: 2024-04-26
Payer: COMMERCIAL

## 2024-04-26 NOTE — TELEPHONE ENCOUNTER
Thank you for your referral to  Home Care. At this time, we are unable to accommodate your patient's needs in a safe and timely manner. In order to help your patient receive quality home care, we have included certified agencies that service this area:         [facility] - P: [phone]; F: [phone]            [facility] - P: [phone]; F:[phone]         You may contact one of these agencies, or an alternate of your choice, to see if they are able to accept your patient.    Thank you,  [facility] Intake

## 2024-04-26 NOTE — HH CARE COORDINATION
Home Care received a referral for Physical Therapy and Occupational Therapy. Unfortunately, we are unable to accept and process the referral at this time.    Patients, please reach out to the referring provider or your PCP to assist in obtaining an alternative home care agency and/or guidance to meet your needs.    Providers, please reach out to  Home Care with any questions regarding the declined referral.

## 2024-05-31 ENCOUNTER — OFFICE VISIT (OUTPATIENT)
Dept: PRIMARY CARE | Facility: CLINIC | Age: 44
End: 2024-05-31
Payer: COMMERCIAL

## 2024-05-31 VITALS
TEMPERATURE: 97.1 F | HEART RATE: 99 BPM | WEIGHT: 185 LBS | OXYGEN SATURATION: 99 % | BODY MASS INDEX: 29.03 KG/M2 | HEIGHT: 67 IN | SYSTOLIC BLOOD PRESSURE: 131 MMHG | DIASTOLIC BLOOD PRESSURE: 85 MMHG

## 2024-05-31 DIAGNOSIS — Z12.31 ENCOUNTER FOR SCREENING MAMMOGRAM FOR MALIGNANT NEOPLASM OF BREAST: Primary | ICD-10-CM

## 2024-05-31 DIAGNOSIS — E11.69 TYPE 2 DIABETES MELLITUS WITH OTHER SPECIFIED COMPLICATION, WITH LONG-TERM CURRENT USE OF INSULIN (MULTI): ICD-10-CM

## 2024-05-31 DIAGNOSIS — E11.9 INSULIN DEPENDENT TYPE 2 DIABETES MELLITUS (MULTI): ICD-10-CM

## 2024-05-31 DIAGNOSIS — Z79.4 TYPE 2 DIABETES MELLITUS WITH OTHER SPECIFIED COMPLICATION, WITH LONG-TERM CURRENT USE OF INSULIN (MULTI): ICD-10-CM

## 2024-05-31 DIAGNOSIS — R41.82 ALTERED MENTAL STATUS, UNSPECIFIED ALTERED MENTAL STATUS TYPE: ICD-10-CM

## 2024-05-31 DIAGNOSIS — G60.9 IDIOPATHIC PERIPHERAL NEUROPATHY: Primary | ICD-10-CM

## 2024-05-31 DIAGNOSIS — E78.5 HYPERLIPIDEMIA, UNSPECIFIED HYPERLIPIDEMIA TYPE: ICD-10-CM

## 2024-05-31 DIAGNOSIS — E11.40 DIABETIC NEUROPATHY, PAINFUL (MULTI): ICD-10-CM

## 2024-05-31 DIAGNOSIS — B37.9 YEAST INFECTION: ICD-10-CM

## 2024-05-31 DIAGNOSIS — Z79.4 INSULIN DEPENDENT TYPE 2 DIABETES MELLITUS (MULTI): ICD-10-CM

## 2024-05-31 PROBLEM — R11.0 DAILY NAUSEA: Status: ACTIVE | Noted: 2024-05-31

## 2024-05-31 PROBLEM — K21.9 GERD (GASTROESOPHAGEAL REFLUX DISEASE): Status: ACTIVE | Noted: 2024-05-31

## 2024-05-31 PROBLEM — J02.0 STREP PHARYNGITIS: Status: ACTIVE | Noted: 2024-05-31

## 2024-05-31 PROBLEM — R42 VERTIGO: Status: ACTIVE | Noted: 2024-05-31

## 2024-05-31 PROBLEM — K59.00 CONSTIPATION: Status: ACTIVE | Noted: 2024-05-31

## 2024-05-31 PROBLEM — J38.1 REINKE'S EDEMA OF VOCAL FOLDS: Status: ACTIVE | Noted: 2024-05-31

## 2024-05-31 PROBLEM — L02.92 RECURRENT BOILS: Status: ACTIVE | Noted: 2024-05-31

## 2024-05-31 PROBLEM — F31.81 BIPOLAR II DISORDER (MULTI): Chronic | Status: ACTIVE | Noted: 2023-06-26

## 2024-05-31 PROBLEM — G43.909 MIGRAINE: Status: ACTIVE | Noted: 2024-05-31

## 2024-05-31 PROBLEM — R09.A2 GLOBUS SENSATION: Status: ACTIVE | Noted: 2024-05-31

## 2024-05-31 PROBLEM — N39.0 UTI (URINARY TRACT INFECTION): Status: ACTIVE | Noted: 2024-05-31

## 2024-05-31 PROBLEM — L60.3 DYSTROPHIC NAIL: Status: ACTIVE | Noted: 2024-05-31

## 2024-05-31 PROBLEM — L85.3 ASTEATOSIS CUTIS: Status: ACTIVE | Noted: 2024-05-31

## 2024-05-31 PROBLEM — L73.9 FOLLICULITIS: Status: ACTIVE | Noted: 2024-05-31

## 2024-05-31 PROBLEM — G56.22 ULNAR NERVE COMPRESSION, LEFT: Status: ACTIVE | Noted: 2024-05-31

## 2024-05-31 PROBLEM — R05.9 COUGH: Status: ACTIVE | Noted: 2024-05-31

## 2024-05-31 PROBLEM — Z79.85 LONG-TERM (CURRENT) USE OF INJECTABLE NON-INSULIN ANTIDIABETIC DRUGS: Status: ACTIVE | Noted: 2023-07-13

## 2024-05-31 PROBLEM — I10 HYPERTENSION: Status: ACTIVE | Noted: 2023-07-13

## 2024-05-31 PROBLEM — S39.92XA INJURY OF BACK: Status: ACTIVE | Noted: 2023-03-27

## 2024-05-31 PROBLEM — L85.3 XEROSIS OF SKIN: Status: ACTIVE | Noted: 2024-05-31

## 2024-05-31 PROBLEM — F17.200 NICOTINE DEPENDENCE: Status: ACTIVE | Noted: 2023-01-17

## 2024-05-31 PROBLEM — E04.0 DIFFUSE GOITER: Status: ACTIVE | Noted: 2024-05-31

## 2024-05-31 PROBLEM — M54.50 LOW BACK PAIN: Status: ACTIVE | Noted: 2024-05-31

## 2024-05-31 PROBLEM — G56.00 CARPAL TUNNEL SYNDROME: Status: ACTIVE | Noted: 2024-05-31

## 2024-05-31 PROBLEM — G47.00 INSOMNIA: Status: ACTIVE | Noted: 2024-05-31

## 2024-05-31 PROBLEM — S00.03XA HEMATOMA OF SCALP: Status: ACTIVE | Noted: 2024-05-31

## 2024-05-31 PROBLEM — M79.672 FOOT PAIN, BILATERAL: Status: ACTIVE | Noted: 2024-05-31

## 2024-05-31 PROBLEM — L29.89 PALMAR PRURITUS: Status: ACTIVE | Noted: 2024-05-31

## 2024-05-31 PROBLEM — R06.02 SHORTNESS OF BREATH: Status: ACTIVE | Noted: 2024-05-31

## 2024-05-31 PROBLEM — H54.7 VISUAL IMPAIRMENT: Status: ACTIVE | Noted: 2020-07-08
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
## 2024-05-31 PROBLEM — L29.8 PALMAR PRURITUS: Status: ACTIVE | Noted: 2024-05-31

## 2024-05-31 PROBLEM — Z86.39 HISTORY OF DIABETES MELLITUS: Status: ACTIVE | Noted: 2024-05-31

## 2024-05-31 PROBLEM — L53.8 PALMAR ERYTHEMA: Status: ACTIVE | Noted: 2024-05-31

## 2024-05-31 PROBLEM — H52.13 MYOPIA OF BOTH EYES: Status: ACTIVE | Noted: 2020-07-08

## 2024-05-31 PROBLEM — R60.9 EDEMA: Status: ACTIVE | Noted: 2024-05-31

## 2024-05-31 PROBLEM — R94.31 ABNORMAL ELECTROCARDIOGRAPHY: Status: ACTIVE | Noted: 2024-05-31

## 2024-05-31 PROBLEM — I25.10 ASCVD (ARTERIOSCLEROTIC CARDIOVASCULAR DISEASE): Status: ACTIVE | Noted: 2024-05-31

## 2024-05-31 PROBLEM — H54.7 REDUCED VISION: Status: ACTIVE | Noted: 2024-05-31

## 2024-05-31 PROBLEM — R35.0 FREQUENCY OF URINATION: Status: ACTIVE | Noted: 2024-05-31

## 2024-05-31 PROBLEM — G62.9 POLYNEUROPATHY: Status: ACTIVE | Noted: 2024-04-25

## 2024-05-31 PROBLEM — H04.129 TEAR FILM INSUFFICIENCY: Status: ACTIVE | Noted: 2024-05-31

## 2024-05-31 PROBLEM — H25.811 COMBINED FORMS OF AGE-RELATED CATARACT OF RIGHT EYE: Status: ACTIVE | Noted: 2022-11-09

## 2024-05-31 PROBLEM — H40.1131 PRIMARY OPEN ANGLE GLAUCOMA (POAG) OF BOTH EYES, MILD STAGE: Status: ACTIVE | Noted: 2020-07-08

## 2024-05-31 PROBLEM — K92.1 HEMATOCHEZIA: Status: ACTIVE | Noted: 2024-05-31

## 2024-05-31 PROBLEM — L02.93 CARBUNCLE: Status: ACTIVE | Noted: 2024-05-31

## 2024-05-31 PROBLEM — F31.30 BIPOLAR AFFECTIVE DISORDER, CURRENT EPISODE DEPRESSED (MULTI): Chronic | Status: ACTIVE | Noted: 2023-03-15

## 2024-05-31 PROBLEM — R29.818 SUSPECTED SLEEP APNEA: Status: ACTIVE | Noted: 2024-05-31

## 2024-05-31 PROBLEM — N76.0 VAGINITIS: Status: ACTIVE | Noted: 2024-05-31

## 2024-05-31 PROBLEM — R63.4 WEIGHT LOSS, UNINTENTIONAL: Status: ACTIVE | Noted: 2024-05-31

## 2024-05-31 PROBLEM — E55.9 VITAMIN D DEFICIENCY: Status: ACTIVE | Noted: 2024-05-31
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
## 2024-05-31 PROBLEM — R32 INCONTINENCE: Status: ACTIVE | Noted: 2024-05-31

## 2024-05-31 PROBLEM — Z20.822 CONTACT WITH AND (SUSPECTED) EXPOSURE TO COVID-19: Status: ACTIVE | Noted: 2023-01-17

## 2024-05-31 PROBLEM — F43.10 PTSD (POST-TRAUMATIC STRESS DISORDER): Status: ACTIVE | Noted: 2023-06-26

## 2024-05-31 PROBLEM — R10.9 ABDOMINAL PAIN: Status: ACTIVE | Noted: 2023-07-05

## 2024-05-31 PROBLEM — Z59.41 FOOD INSECURITY: Status: ACTIVE | Noted: 2024-05-31

## 2024-05-31 PROBLEM — G62.9 NEUROPATHY: Status: ACTIVE | Noted: 2024-05-31

## 2024-05-31 PROBLEM — G43.909 MIGRAINE HEADACHE: Status: ACTIVE | Noted: 2024-05-31

## 2024-05-31 PROBLEM — R13.10 DYSPHAGIA: Status: ACTIVE | Noted: 2024-05-31

## 2024-05-31 PROBLEM — M25.522 LEFT ELBOW PAIN: Status: ACTIVE | Noted: 2024-05-31

## 2024-05-31 PROBLEM — B37.31 CANDIDA VAGINITIS: Status: ACTIVE | Noted: 2024-05-31

## 2024-05-31 PROBLEM — K61.0 PERIANAL ABSCESS: Status: ACTIVE | Noted: 2024-05-31

## 2024-05-31 PROBLEM — M79.671 FOOT PAIN, BILATERAL: Status: ACTIVE | Noted: 2024-05-31

## 2024-05-31 PROCEDURE — 3075F SYST BP GE 130 - 139MM HG: CPT | Performed by: STUDENT IN AN ORGANIZED HEALTH CARE EDUCATION/TRAINING PROGRAM

## 2024-05-31 PROCEDURE — 99213 OFFICE O/P EST LOW 20 MIN: CPT | Performed by: STUDENT IN AN ORGANIZED HEALTH CARE EDUCATION/TRAINING PROGRAM

## 2024-05-31 PROCEDURE — 3046F HEMOGLOBIN A1C LEVEL >9.0%: CPT | Performed by: STUDENT IN AN ORGANIZED HEALTH CARE EDUCATION/TRAINING PROGRAM

## 2024-05-31 PROCEDURE — 4010F ACE/ARB THERAPY RXD/TAKEN: CPT | Performed by: STUDENT IN AN ORGANIZED HEALTH CARE EDUCATION/TRAINING PROGRAM

## 2024-05-31 PROCEDURE — G2212 PROLONG OUTPT/OFFICE VIS: HCPCS | Performed by: STUDENT IN AN ORGANIZED HEALTH CARE EDUCATION/TRAINING PROGRAM

## 2024-05-31 PROCEDURE — 3079F DIAST BP 80-89 MM HG: CPT | Performed by: STUDENT IN AN ORGANIZED HEALTH CARE EDUCATION/TRAINING PROGRAM

## 2024-05-31 RX ORDER — ISOPROPYL ALCOHOL 70 ML/100ML
1 SWAB TOPICAL DAILY
Qty: 100 EACH | Refills: 11 | Status: SHIPPED | OUTPATIENT
Start: 2024-05-31

## 2024-05-31 RX ORDER — GABAPENTIN 800 MG/1
800 TABLET ORAL 3 TIMES DAILY
Qty: 270 TABLET | Refills: 1 | Status: SHIPPED | OUTPATIENT
Start: 2024-05-31

## 2024-05-31 RX ORDER — FLUCONAZOLE 150 MG/1
150 TABLET ORAL DAILY
Qty: 2 TABLET | Refills: 0 | Status: SHIPPED | OUTPATIENT
Start: 2024-05-31

## 2024-05-31 RX ORDER — BLOOD-GLUCOSE CONTROL, NORMAL
EACH MISCELLANEOUS
Qty: 204 EACH | Refills: 3 | Status: SHIPPED | OUTPATIENT
Start: 2024-05-31

## 2024-05-31 RX ORDER — PEN NEEDLE, DIABETIC 30 GX3/16"
NEEDLE, DISPOSABLE MISCELLANEOUS
Qty: 100 EACH | Refills: 11 | Status: SHIPPED | OUTPATIENT
Start: 2024-05-31 | End: 2025-05-31

## 2024-05-31 RX ORDER — ASPIRIN 81 MG/1
81 TABLET ORAL DAILY
Qty: 30 TABLET | Refills: 11 | Status: SHIPPED | OUTPATIENT
Start: 2024-05-31 | End: 2025-05-31

## 2024-05-31 RX ORDER — ATORVASTATIN CALCIUM 40 MG/1
40 TABLET, FILM COATED ORAL DAILY
Qty: 90 TABLET | Refills: 3 | Status: SHIPPED | OUTPATIENT
Start: 2024-05-31

## 2024-05-31 RX ORDER — DULAGLUTIDE 0.75 MG/.5ML
0.75 INJECTION, SOLUTION SUBCUTANEOUS
Qty: 2 ML | Refills: 2 | Status: SHIPPED | OUTPATIENT
Start: 2024-06-02

## 2024-05-31 RX ORDER — LISINOPRIL 10 MG/1
10 TABLET ORAL DAILY
Qty: 90 TABLET | Refills: 3 | Status: SHIPPED | OUTPATIENT
Start: 2024-05-31

## 2024-05-31 RX ORDER — INSULIN GLARGINE 100 [IU]/ML
75 INJECTION, SOLUTION SUBCUTANEOUS NIGHTLY
Qty: 67.5 ML | Refills: 3 | Status: SHIPPED | OUTPATIENT
Start: 2024-05-31 | End: 2025-05-31

## 2024-05-31 ASSESSMENT — PAIN SCALES - GENERAL: PAINLEVEL: 9

## 2024-05-31 NOTE — PROGRESS NOTES
I reviewed the resident/fellow's documentation and discussed the patient with the resident/fellow. I agree with the resident/fellow's medical decision making as documented in their note with the exception/addition of the following:    Recommend increasing Gabapentin dose today since pain level 10 out of 10 today:  -usual max effective dose: 3200 mg daily in divided doses  -max allowed dose: 3600 mg daily in divided doses    Marah Workman MD

## 2024-05-31 NOTE — PROGRESS NOTES
"Subjective   Patient ID: Patricia Vieyra is a 43 y.o. female who presents for Follow-up ('pains in my lower back,hands,legs and I can't hold up to things' per pt).      HPI    #back pain  Pain in lower back  Fell 3 weeks ago  For the past 2 half and years she has been falling a lot   She doesn't feel dizzy prior to fall, her legs just give out   She is unable to do her ADLS  Home health aid was ordered but she doesn't have it set up   She has to do a waiver program for he daughter to be hha  Takes gabapentin but it doesn't help  Was on cymbalta in the past but it didn't help  Has tried pt in the past and it makes her hurt even more   Uses topical treatment and takes motrin   Pain starts in the back and radiates down to legs  Pain feels like throbbing pain with pins and needles sensation  It is consistent pain  Unable to walk 4 steps and uses walker to ambulate    #dm  Has an endo appt set up  Has had good blood sugars 200s post prandial  And fasting around 120s-130s  Has an eye and foot exam scheduled   Endorses compliance with Trulicity and insulin     #htn   Denies chest pain, sob, dizziness  Endorses compliance with lisinopril, tolerating medication well      Review of Systems  ROS negative except for above mentioned.      Objective   /85 (BP Location: Right arm, Patient Position: Sitting)   Pulse 99   Temp 36.2 °C (97.1 °F) (Temporal)   Ht 1.702 m (5' 7\") Comment: per pt  Wt 83.9 kg (185 lb)   SpO2 99%   BMI 28.98 kg/m²     Physical Exam  General: Well developed, well nourished, alert and cooperative, appears to be in no acute distress.   HEENT: Normocephalic, atraumatic.   Cardiac: Normal S1 and S2. No S3, S4, or murmurs. Rhythm is regular. No peripheral edema, cyanosis, or pallor. Extremities warm and well perfused.   Lungs: Clear to auscultation bilaterally. No rales, rhonchi, wheezing, diminished breath sounds.   MSK: ROM limited in spine and lower extremities due to pain. Uses walker to ambulate. " "  Neuro: CN II-XII grossly intact but not individually tested.   Psych: Oriented to person, place, and time.     Assessment/Plan   A 43 -year-old female with past medical history of hypertension, bipolar disorder, uncontrolled DM2 complicated by neuropathy (leading to falls), glaucoma, bladder incontinence, and recurrent yeast infections presents for follow up on her neuropathy. At this time her neuropathy is not well controlled and limits her ADLS significantly. We discussed increasing to max dose of gabapentin to 3600 mg however patient states the medication makes her sleepy and does not help much so she would like to hold off on increasing dose. She has tried Cymbalta, amitriptyline , PT in the past with no resolution. She currently uses topical and OTC pain medications aswell. Plan for referral to pain management for further eval, can consider switching from gabapentin to lyrica to see if that helps better.     Problem List Items Addressed This Visit       Hyperlipidemia  Chronic, stable     Relevant Medications    Cw atorvastatin (Lipitor) 40 mg tablet once daily       Type 2 diabetes mellitus with other specified complication, with long-term current use of insulin (Multi)      Chronic, not at goal     Relevant Medications    pen needle, diabetic 31 gauge x 5/16\" needle    lancets (Easy Comfort Lancets) 30 gauge misc    Cw insulin glargine (Lantus) 100 unit/mL injection, 75 units once daily at night     Cw dulaglutide (Trulicity) 0.75 mg/0.5 mL pen injector (Start on 6/2/2024) once a week     alcohol swabs (Pro Comfort Alcohol Pads) pads, medicated    Diabetic neuropathy, painful (Multi)      Chronic, not at goal     Relevant Medications    Cw gabapentin (Neurontin) 800 mg tablet TID  Referral to pain management for further eval     Yeast infection    Acute, complicated by DM       Relevant Medications    Start fluconazole (Diflucan) 150 mg tablet once         RTC in 1 month for a fu on DM with A1c " check    Patient discussed with attending, Dr. Ji Solorio MD  Family Medicine, PGY-3

## 2024-06-05 DIAGNOSIS — M54.50 LOW BACK PAIN, UNSPECIFIED BACK PAIN LATERALITY, UNSPECIFIED CHRONICITY, UNSPECIFIED WHETHER SCIATICA PRESENT: ICD-10-CM

## 2024-06-17 RX ORDER — LIDOCAINE 50 MG/G
1 PATCH TOPICAL DAILY
Qty: 30 PATCH | Refills: 11 | Status: SHIPPED | OUTPATIENT
Start: 2024-06-17 | End: 2025-06-17

## 2024-07-09 ENCOUNTER — APPOINTMENT (OUTPATIENT)
Dept: PRIMARY CARE | Facility: CLINIC | Age: 44
End: 2024-07-09
Payer: COMMERCIAL

## 2024-07-09 VITALS
DIASTOLIC BLOOD PRESSURE: 88 MMHG | TEMPERATURE: 98.1 F | BODY MASS INDEX: 30.37 KG/M2 | SYSTOLIC BLOOD PRESSURE: 132 MMHG | HEART RATE: 100 BPM | HEIGHT: 67 IN | RESPIRATION RATE: 18 BRPM | OXYGEN SATURATION: 98 % | WEIGHT: 193.5 LBS

## 2024-07-09 DIAGNOSIS — Z79.4 TYPE 2 DIABETES MELLITUS WITH OTHER SPECIFIED COMPLICATION, WITH LONG-TERM CURRENT USE OF INSULIN (MULTI): Primary | ICD-10-CM

## 2024-07-09 DIAGNOSIS — R51.9 NONINTRACTABLE HEADACHE, UNSPECIFIED CHRONICITY PATTERN, UNSPECIFIED HEADACHE TYPE: ICD-10-CM

## 2024-07-09 DIAGNOSIS — E11.69 TYPE 2 DIABETES MELLITUS WITH OTHER SPECIFIED COMPLICATION, WITH LONG-TERM CURRENT USE OF INSULIN (MULTI): Primary | ICD-10-CM

## 2024-07-09 DIAGNOSIS — E11.40 DIABETIC NEUROPATHY, PAINFUL (MULTI): ICD-10-CM

## 2024-07-09 DIAGNOSIS — L02.224 BOIL OF GROIN: ICD-10-CM

## 2024-07-09 DIAGNOSIS — B37.9 YEAST INFECTION: ICD-10-CM

## 2024-07-09 LAB — POC HEMOGLOBIN A1C: 12 % (ref 4.2–6.5)

## 2024-07-09 PROCEDURE — 3079F DIAST BP 80-89 MM HG: CPT

## 2024-07-09 PROCEDURE — 99214 OFFICE O/P EST MOD 30 MIN: CPT

## 2024-07-09 PROCEDURE — 83036 HEMOGLOBIN GLYCOSYLATED A1C: CPT

## 2024-07-09 PROCEDURE — 3046F HEMOGLOBIN A1C LEVEL >9.0%: CPT

## 2024-07-09 PROCEDURE — 3075F SYST BP GE 130 - 139MM HG: CPT

## 2024-07-09 PROCEDURE — 4010F ACE/ARB THERAPY RXD/TAKEN: CPT

## 2024-07-09 RX ORDER — CHOLECALCIFEROL (VITAMIN D3) 50 MCG
1 CAPSULE ORAL ONCE
Qty: 30 TABLET | Refills: 11 | Status: SHIPPED | OUTPATIENT
Start: 2024-07-09 | End: 2024-07-09

## 2024-07-09 RX ORDER — BLOOD-GLUCOSE,RECEIVER,CONT
EACH MISCELLANEOUS
Qty: 1 EACH | Refills: 0 | Status: SHIPPED | OUTPATIENT
Start: 2024-07-09

## 2024-07-09 RX ORDER — ACETAMINOPHEN, ASPRIN, CAFFEINE 250; 250; 65 MG/1; MG/1; MG/1
1 TABLET, COATED ORAL EVERY 6 HOURS PRN
Qty: 30 TABLET | Refills: 0 | Status: SHIPPED | OUTPATIENT
Start: 2024-07-09 | End: 2024-07-19

## 2024-07-09 RX ORDER — ISOPROPYL ALCOHOL 70 ML/100ML
1 SWAB TOPICAL DAILY
Qty: 100 EACH | Refills: 11 | Status: SHIPPED | OUTPATIENT
Start: 2024-07-09

## 2024-07-09 RX ORDER — LIDOCAINE 50 MG/G
1 OINTMENT TOPICAL AS NEEDED
Qty: 50 G | Refills: 3 | Status: SHIPPED | OUTPATIENT
Start: 2024-07-09 | End: 2025-07-09

## 2024-07-09 RX ORDER — METFORMIN HYDROCHLORIDE 500 MG/1
500 TABLET, EXTENDED RELEASE ORAL
Qty: 100 TABLET | Refills: 3 | Status: SHIPPED | OUTPATIENT
Start: 2024-07-09 | End: 2024-07-09 | Stop reason: SDDI

## 2024-07-09 RX ORDER — DULAGLUTIDE 0.75 MG/.5ML
1.5 INJECTION, SOLUTION SUBCUTANEOUS
Qty: 2 ML | Refills: 2 | Status: SHIPPED | OUTPATIENT
Start: 2024-07-09

## 2024-07-09 RX ORDER — BLOOD-GLUCOSE SENSOR
EACH MISCELLANEOUS
Qty: 90 EACH | Refills: 11 | Status: SHIPPED | OUTPATIENT
Start: 2024-07-09

## 2024-07-09 RX ORDER — MAG HYDROX/ALUMINUM HYD/SIMETH 200-200-20
SUSPENSION, ORAL (FINAL DOSE FORM) ORAL 2 TIMES DAILY
Qty: 56 G | Refills: 3 | Status: SHIPPED | OUTPATIENT
Start: 2024-07-09

## 2024-07-09 RX ORDER — FLUCONAZOLE 150 MG/1
150 TABLET ORAL ONCE
Qty: 1 TABLET | Refills: 0 | Status: SHIPPED | OUTPATIENT
Start: 2024-07-09 | End: 2024-07-09 | Stop reason: WASHOUT

## 2024-07-09 ASSESSMENT — ENCOUNTER SYMPTOMS
OCCASIONAL FEELINGS OF UNSTEADINESS: 0
LOSS OF SENSATION IN FEET: 0
DEPRESSION: 0

## 2024-07-09 ASSESSMENT — PAIN SCALES - GENERAL: PAINLEVEL: 10-WORST PAIN EVER

## 2024-07-09 ASSESSMENT — COLUMBIA-SUICIDE SEVERITY RATING SCALE - C-SSRS
2. HAVE YOU ACTUALLY HAD ANY THOUGHTS OF KILLING YOURSELF?: NO
1. IN THE PAST MONTH, HAVE YOU WISHED YOU WERE DEAD OR WISHED YOU COULD GO TO SLEEP AND NOT WAKE UP?: NO
6. HAVE YOU EVER DONE ANYTHING, STARTED TO DO ANYTHING, OR PREPARED TO DO ANYTHING TO END YOUR LIFE?: NO

## 2024-07-09 ASSESSMENT — PATIENT HEALTH QUESTIONNAIRE - PHQ9
SUM OF ALL RESPONSES TO PHQ9 QUESTIONS 1 AND 2: 0
2. FEELING DOWN, DEPRESSED OR HOPELESS: NOT AT ALL
1. LITTLE INTEREST OR PLEASURE IN DOING THINGS: NOT AT ALL

## 2024-07-09 NOTE — PROGRESS NOTES
Attestation: I discussed the patient with the attending and resident, and reviewed the  documentation. I agree with the residents medical decision making and plans as documented in the note.       Fanny Jones MD  FM & PM Resident

## 2024-07-09 NOTE — PROGRESS NOTES
"Subjective   Patient ID: Patricia Vieyra is a 43 y.o. female who presents for follow-up.     HPI     # boil in genital region   - x 1 week   - burning pain when sitting down on perineum   - does not shave in area.   - states that she gets boils frequently every 2-3 months. Started when she was 11.   - took keflex prophylactically in the past.     # bumps on face   - x 1 year   - tried noxzema which did not help.     # rash on chest   - never happened before   - x 3 weeks.   - pruritus with burning pain.   - denies changes in detergent, shampoos, body wash    # DMII  -  current regimen: 75 units lantus at bedtime and 3 U lispro TID   - 62 at night. Depends on whether she eats or not.   - normally 200-330's. 510 was highest postprandial glucose.   - maintaining 3 square meals. A typical meal would consist of fish, mac and cheese, tacos with taco salad, pork chops with vegetables, usually prepares foods with baking.   - Denies any recent changes in vision. States that she has chronic eye burning when she gets up x many years. Endorses hx of glaucoma but has not been taking his eye drops.   - endorses GI side effects with metformin XR. Jardiance stopped in the past due to hx of yeast infections.     Review of Systems  +ve frontal headache, other 12 point ROS negative except stated in HPI.     Objective   /88 (BP Location: Left arm, Patient Position: Sitting, BP Cuff Size: Adult)   Pulse 100   Temp 36.7 °C (98.1 °F) (Temporal)   Resp 18   Ht 1.702 m (5' 7\")   Wt 87.8 kg (193 lb 8 oz)   SpO2 98%   BMI 30.31 kg/m²     Physical Exam  Gen: NAD, nontox, well-developed   HEENT: NCAT. MMM.  RESP: no resp distress, talks in full sentences, CTABL  CVS: non-tachycardic, RRR  ABD: Soft, non-distended  Psych: appropriately answers questions. normal mood and affect  MSK: moves extremities spontaneously  : macular hypopigmented lesion with mild erythema on left labia majora approx 5 mm in diameter with some skin " "breakdown.   Foot: no lesions or ulcerations, no sensation with monofilament exam to 6 sites on plantar surface of foot  bilaterally.     Assessment/Plan   ROMEL Vieyra is a 42 yo F w/ PMHx of IDDM who presents to the clinic for follow-up on diabetes. Her POCT HbA1c was 12 from 11.5 in 3/2024. Pt has been compliant on current regimen and states that she has been attempting to be compliant with a low carbohydrate diet. Pt has chronic peripheral neuropathy not resolved with gabapentin. Will attempt to increase gabapentin dose and will start on duloxetine to assist with relief of symptoms.     # IDDM   :: current regimen: Lantus 75 U at bedtime and 3U lispro TID. Trulicity 0.75 mg qweek. Asymptomatic hypoglycemia to 60's \"once in a while\" at night.  Majority of glucose readings between 200-300's. States that at highest, her glucose level will be in 500's.   :: POCT hbA1c of 12 from 11.5 ( 3/2024)   :: pt as on metformin XR on past but did not tolerate due to GI side effects. Not amenable tor restart.   - Increase trulicity to 1.5 from 0.75 qweek   - Start jardiance 10 mg every day. Pt previously on 25 mg jardiance but was discontinued due to recurrent vaginal candidiasis.   - Ordered CGM with free tiara 3. Per pt she had attempted to obtain in the past but was not sure how to install it on phone. States that she will have pharmacist explain to her how to download the will and get started with CGM   - pt to follow up with ophtho and podiatry     # peripheral neuropathy   :: 2/2 uncontrolled DM   :: monofilament exam with no sensation at 6 sites at bilateral plantar feet. No ulcerations or lesions on foot exam.   :: current medication regimen: duloxetine 60 BID and gabapentin 800 mg TID   - Increased dose to 1200 mg gabapentin TID. Pt has pill splitter at home and encouraged tot take 1.5 tablets of 800 mg gabapentin     # rash on chest   :: likely 2/2 contact allergic dermatitis   :: on physical exam, pt has small " maculopapular erythematous lesions on chest with no skin breakdown.   - Apply 1% hydrocortisone ointment.   - Will follow up on sx.     # papular lesion of labia majora   :: likely bartholin cyst   :: on vaginal exam pt has small ~ 5 mm diameter skin breakdown where the papule had burst with mild erythema.   - pt advised to apply warm compress to area and to wear loose clothing   - Apply lidocaine ointment for pain     # recurrent candida vulvovaginitis   :: iso IDDM   :: pt endorses white discharge and pruritis. Not seen on vaginal exam but did not do speculum exam.   - prescribed diflucan 150 mg.   - Start probiotic for recurrent yeast infections. Will consider longer term fluconazole therapy vs. Topical therapy at follow-up with maintenance therapy. Prospective regimen ( 7-14 day of topical therapy vs. Oral fluconazole q3d x 3 doses and maintenance with oral fluconazole qweekly x 6 months.     RTC in 1 month to follow-up on DMII     Seen and discussed with Dr. Last Parra MD, MPH   Family Medicine and Preventive Health, PGY-2

## 2024-07-09 NOTE — PATIENT INSTRUCTIONS
- Add metformin 500 once a day.  - Add jardiance 10 mg   - insulin dose same   - increaseing 1.5 from 0.75   - get the continuous glucose monitor from the pharmacy   - diflucan for yeast   - lidocaine topical for boil   - hydrocortisone for rash on skin,  - excedrin for headaches.

## 2024-07-10 NOTE — PROGRESS NOTES
I saw and evaluated the patient. I personally obtained the key and critical portions of the history and physical exam or was physically present for key and critical portions performed by the resident/fellow. I reviewed the resident/fellow's documentation and discussed the patient with the resident/fellow. I agree with the resident/fellow's medical decision making as documented in the note.    Gaby Ni MD

## 2024-07-16 ENCOUNTER — PATIENT OUTREACH (OUTPATIENT)
Dept: CARE COORDINATION | Facility: CLINIC | Age: 44
End: 2024-07-16
Payer: COMMERCIAL

## 2024-07-16 NOTE — PROGRESS NOTES
Outreach call and Patricia shared she has had diabetes education in the past but is open to meeting via Zoom. Let her know the call will show up in her My Chart as a phone call, but we will meet on Zoom next week as the link was sent.

## 2024-07-24 ENCOUNTER — APPOINTMENT (OUTPATIENT)
Dept: CARE COORDINATION | Facility: CLINIC | Age: 44
End: 2024-07-24
Payer: COMMERCIAL

## 2024-08-01 DIAGNOSIS — E11.69 TYPE 2 DIABETES MELLITUS WITH OTHER SPECIFIED COMPLICATION, WITH LONG-TERM CURRENT USE OF INSULIN (MULTI): ICD-10-CM

## 2024-08-01 DIAGNOSIS — Z79.4 TYPE 2 DIABETES MELLITUS WITH OTHER SPECIFIED COMPLICATION, WITH LONG-TERM CURRENT USE OF INSULIN (MULTI): ICD-10-CM

## 2024-08-01 RX ORDER — INSULIN GLARGINE 100 [IU]/ML
INJECTION, SOLUTION SUBCUTANEOUS
Qty: 20 ML | Refills: 3 | Status: SHIPPED | OUTPATIENT
Start: 2024-08-01

## 2024-08-16 ENCOUNTER — APPOINTMENT (OUTPATIENT)
Dept: CARE COORDINATION | Facility: CLINIC | Age: 44
End: 2024-08-16
Payer: COMMERCIAL

## 2024-08-22 ENCOUNTER — APPOINTMENT (OUTPATIENT)
Dept: PODIATRY | Facility: HOSPITAL | Age: 44
End: 2024-08-22
Payer: COMMERCIAL

## 2024-08-27 ENCOUNTER — APPOINTMENT (OUTPATIENT)
Dept: PRIMARY CARE | Facility: CLINIC | Age: 44
End: 2024-08-27
Payer: COMMERCIAL

## 2024-08-28 NOTE — PROGRESS NOTES
"Subjective   Patient ID: Patricia Vieyra is a 43 y.o. female who presents for diabetes follow-up.     HPI     # Type II DM  - At last appointment made the following changes:     Increased trulicity from 0.75 mg/ week to 1.5 mg/ week.     started Jardiance 10 mg     restarted metformin   - insulin 75 U at bedtime   - pt has dexacom. Per review of last 3 months, she has had glucose of 200's to 300's most of the day but has a trough around noon. Has had 9 hypotlycemic episodes in last 3 months. Pt symptomatic with \"jitters,\" light-headedness, and nausea.   - poct hba1c decreased to 9.1 today from 12 prior   - endorses polydipsia.   - endo appointment in 9/18. Optho appointment in 9/3. Podiatry appointment in 12/2.     # hx of falls   - uses walker and cane at baseline   - endorses 3 falls x week. Had last fall 2 weeks ago where she fell on her right side but denies head strike. Some muscle soreness.   - states that \" legs give out.\"   - Refuses PT. States that she has tried PT in the past but its too painful.     # SOB   - progressive   - states that she ca walk 5 feet before she has to stop due to SOB   - 40 pack year smoker     # Dysphagia   - endorses pain with swallowing.   - states that solids and liquids get stuck in her throat. Progressive. Started with solids--> liquids   - endorses regurg   - no recent weight loss   - of note pt is 40 pack year smoker. Currently active smoker. Smokes 15 cigarettes / day     Review of Systems  12 point ROS negative except as stated in HPI.     Objective   Vitals:    08/30/24 0836   BP: (!) 159/94   Pulse: 96   Temp: 36.3 °C (97.4 °F)   SpO2: 97%         Physical Exam  Gen: NAD, nontox, atalgic gait, walks with walker.   HEENT: NCAT. EOMI. MMM.  RESP: no resp distress, talks in full sentences, CTABL  CVS: non-tachycardic, RRR  ABD: Soft, non-distended  Psych: appropriately answers questions. normal mood and affect  MSK: appears to have Full range of motion on all " extremities.  Foot: no ulcerations or wounds noted. No sensation in bilateral feet to microfilament test     Assessment/Plan   ROMEL Vieyra is a 42 yo F w/ PMHx of IDDM, severe peripheral neuropathy 2/2 IDDM, and HTN who presents to the clinic for follow up on IDDM and peripheral neuropathy with new complaint of progressive dysphagia. POCT hbA1c obtained in clinic but obtained prematurely as pt recently had HbA1c done 1 month ago with level of 12. Today HbA1c is 9.1 but may not be accurate as enough time has not been allowed for RBC turnover. Adjusted medications based on dexacom glucose levels. Pt does not appear to be adequate glycemic control as glucose ranges from 270-300's for most of the day with a trough around mid-day when the glucose is at its lowest around 100's. She also appears to have some hypoglycemic periods with 7 over the last 3 months per her dexacom. Will increase metformin 500 mg BID to 1000 BID . Will dose lantus dosing to 40U BID instead of the 75 U at bedtime as the pt appears to have a trough when reaching 1/2 life with elevated glucose otherwise. Pt presents with new complaint of progressive dysphagia to liquids and solids with odynophagia. Iso 40 pack year smoking history will require worklup for possible obstructing mass vs. Inflammatory changes. Will refer to GI for endoscopy. Pt also complains of chronic SOB with short distances. Iso tobacco use disorder will need workup for COPD. Referral provided for PFT. Also reordered ECHO as pt was recommended she obtain it by previous provider but never obtained.     # IDDM   :: HbA1c of 9.1 form 12 one month ago. Prematurely obtained HbA1c so may not be accurate.   :: per dexacom, does not appear to have adequate glycemic control with glucose to 200 and 300's with trough at mid day.   - dosed lantus from 75 U at bedtime to 40 U BID.   - increased metformin form 500 mg BID to 1000 mg BID.     # dysphagia   # odynophagia   :: 40 pack year smoker.  Current tobacco use.   :: chronic and progressive   - referral to GI for endoscopy     # tobacco use disorder   - counseled on tobacco use cessation. Pt interested in seeing a counselor for tobacco use. Pt appears to be knowledgeable about health effects of smoking and is very interested in quitting. However confidence level of 0/10 as she states she is under a lot of stress lately. Does not want to talk about cause of stress.   - Referral placed for tobacco cessation counseling.   - nicotine replacement therapy provided. Provided education on how to use.     # SOB   :: able to walk 5 feet before stopping due to SOB   :: denies cp   :: iso 40 pack year tobacco use.   - obtain PFT to assess for COPD   - obtain ECHO     # food insecurity   :: questionnaire done   - referral to [facility].     # HTN   :: IO BP of 159/94 . Similar BP on repeat.   - ordered BP cuff. Pt to take BP at home and come back for follow up in 1 month     RTC in 1 month for BP follow-up and to discuss results of tests ordered.     Seen and discussed with Dr. Trent Parra MD, MPH   Family Medicine and Preventive Health, PGY-3

## 2024-08-30 ENCOUNTER — APPOINTMENT (OUTPATIENT)
Dept: PRIMARY CARE | Facility: CLINIC | Age: 44
End: 2024-08-30
Payer: COMMERCIAL

## 2024-08-30 VITALS
BODY MASS INDEX: 29.35 KG/M2 | OXYGEN SATURATION: 97 % | TEMPERATURE: 97.4 F | HEIGHT: 67 IN | HEART RATE: 96 BPM | DIASTOLIC BLOOD PRESSURE: 94 MMHG | WEIGHT: 187 LBS | SYSTOLIC BLOOD PRESSURE: 159 MMHG

## 2024-08-30 DIAGNOSIS — E11.69 TYPE 2 DIABETES MELLITUS WITH OTHER SPECIFIED COMPLICATION, WITH LONG-TERM CURRENT USE OF INSULIN (MULTI): Primary | ICD-10-CM

## 2024-08-30 DIAGNOSIS — F17.200 TOBACCO USE DISORDER: ICD-10-CM

## 2024-08-30 DIAGNOSIS — Z59.41 FOOD INSECURITY: ICD-10-CM

## 2024-08-30 DIAGNOSIS — R13.10 DYSPHAGIA, UNSPECIFIED TYPE: ICD-10-CM

## 2024-08-30 DIAGNOSIS — I10 PRIMARY HYPERTENSION: ICD-10-CM

## 2024-08-30 DIAGNOSIS — R06.02 SOB (SHORTNESS OF BREATH): ICD-10-CM

## 2024-08-30 DIAGNOSIS — Z79.4 TYPE 2 DIABETES MELLITUS WITH OTHER SPECIFIED COMPLICATION, WITH LONG-TERM CURRENT USE OF INSULIN (MULTI): Primary | ICD-10-CM

## 2024-08-30 LAB — POC HEMOGLOBIN A1C: 9.1 % (ref 4.2–6.5)

## 2024-08-30 PROCEDURE — 3008F BODY MASS INDEX DOCD: CPT

## 2024-08-30 PROCEDURE — 83036 HEMOGLOBIN GLYCOSYLATED A1C: CPT

## 2024-08-30 PROCEDURE — 99407 BEHAV CHNG SMOKING > 10 MIN: CPT

## 2024-08-30 PROCEDURE — 4010F ACE/ARB THERAPY RXD/TAKEN: CPT

## 2024-08-30 PROCEDURE — 99214 OFFICE O/P EST MOD 30 MIN: CPT

## 2024-08-30 PROCEDURE — 3046F HEMOGLOBIN A1C LEVEL >9.0%: CPT

## 2024-08-30 PROCEDURE — 3077F SYST BP >= 140 MM HG: CPT

## 2024-08-30 PROCEDURE — 3080F DIAST BP >= 90 MM HG: CPT

## 2024-08-30 RX ORDER — DIPHENHYDRAMINE HCL 25 MG
4 CAPSULE ORAL AS NEEDED
Qty: 100 EACH | Refills: 0 | Status: SHIPPED | OUTPATIENT
Start: 2024-08-30 | End: 2024-09-29

## 2024-08-30 RX ORDER — IBUPROFEN 200 MG
1 TABLET ORAL EVERY 24 HOURS
Qty: 30 PATCH | Refills: 0 | Status: SHIPPED | OUTPATIENT
Start: 2024-08-30 | End: 2024-09-29

## 2024-08-30 RX ORDER — CALCIUM CARBONATE 750 MG/1
1 TABLET, CHEWABLE ORAL ONCE
Qty: 1 KIT | Refills: 0 | Status: SHIPPED | OUTPATIENT
Start: 2024-08-30 | End: 2024-08-30

## 2024-08-30 SDOH — ECONOMIC STABILITY - FOOD INSECURITY: FOOD INSECURITY: Z59.41

## 2024-08-30 ASSESSMENT — PAIN SCALES - GENERAL: PAINLEVEL: 7

## 2024-08-30 NOTE — PATIENT INSTRUCTIONS
For your diabetes:    Please take lantus 40 U twice a day   Continue taking jardiance 10   Increase metformin to 1000 twice a day     For your heart please get an ECHO:   [phone]    Please get a PFT for your lungs:    [phone]    Someone should call you about scheduling an appointment with Gi for a scope down your throat.

## 2024-09-03 ENCOUNTER — APPOINTMENT (OUTPATIENT)
Dept: OPHTHALMOLOGY | Facility: CLINIC | Age: 44
End: 2024-09-03
Payer: COMMERCIAL

## 2024-09-03 DIAGNOSIS — E11.3299 NPDR (NONPROLIFERATIVE DIABETIC RETINOPATHY) (MULTI): ICD-10-CM

## 2024-09-03 DIAGNOSIS — H40.1132 PRIMARY OPEN ANGLE GLAUCOMA (POAG) OF BOTH EYES, MODERATE STAGE: Primary | ICD-10-CM

## 2024-09-03 DIAGNOSIS — E11.9 CONTROLLED TYPE 2 DIABETES MELLITUS WITHOUT COMPLICATION, UNSPECIFIED WHETHER LONG TERM INSULIN USE (MULTI): ICD-10-CM

## 2024-09-03 PROCEDURE — 99213 OFFICE O/P EST LOW 20 MIN: CPT | Performed by: OPHTHALMOLOGY

## 2024-09-03 PROCEDURE — 92133 CPTRZD OPH DX IMG PST SGM ON: CPT | Performed by: OPHTHALMOLOGY

## 2024-09-03 RX ORDER — DORZOLAMIDE HYDROCHLORIDE AND TIMOLOL MALEATE 20; 5 MG/ML; MG/ML
1 SOLUTION/ DROPS OPHTHALMIC 2 TIMES DAILY
Qty: 15 ML | Refills: 3 | Status: SHIPPED | OUTPATIENT
Start: 2024-09-03 | End: 2025-09-03

## 2024-09-03 ASSESSMENT — REFRACTION_MANIFEST
OS_SPHERE: -3.50
OD_SPHERE: -4.00
OS_SPHERE: -3.50
METHOD_AUTOREFRACTION: 1
OD_SPHERE: -4.00
OS_AXIS: 160
OS_CYLINDER: -0.50
OD_CYLINDER: -0.75
OD_AXIS: 075

## 2024-09-03 ASSESSMENT — VISUAL ACUITY
OS_SC: 20/800
OD_SC: 20/600
OS_PH_SC: 20/500
METHOD: SNELLEN - LINEAR
OD_PH_SC: 20/500

## 2024-09-03 ASSESSMENT — TONOMETRY
OS_IOP_MMHG: 12
IOP_METHOD: GOLDMANN APPLANATION
OD_IOP_MMHG: 12

## 2024-09-03 ASSESSMENT — CUP TO DISC RATIO
OD_RATIO: .4
OS_RATIO: .4

## 2024-09-03 ASSESSMENT — EXTERNAL EXAM - LEFT EYE: OS_EXAM: NORMAL

## 2024-09-03 ASSESSMENT — SLIT LAMP EXAM - LIDS
COMMENTS: GOOD POSITION
COMMENTS: GOOD POSITION

## 2024-09-03 ASSESSMENT — EXTERNAL EXAM - RIGHT EYE: OD_EXAM: NORMAL

## 2024-09-03 NOTE — PROGRESS NOTES
Assessment/Plan   Diagnoses and all orders for this visit:  Primary open angle glaucoma (POAG) of both eyes, moderate stage  -     OCT, Optic Nerve - OU - Both Eyes  continue-     dorzolamide-timoloL (Cosopt) 22.3-6.8 mg/mL ophthalmic solution; Administer 1 drop into both eyes 2 times a day.  Rec glaucoma specialist eval    Controlled type 2 diabetes mellitus without complication, unspecified whether long term insulin use (Multi)  NPDR (nonproliferative diabetic retinopathy) (Multi)  Retina consult  Glasses prescription given to patient today

## 2024-09-04 ENCOUNTER — APPOINTMENT (OUTPATIENT)
Dept: CARE COORDINATION | Facility: CLINIC | Age: 44
End: 2024-09-04
Payer: COMMERCIAL

## 2024-09-04 ENCOUNTER — PATIENT OUTREACH (OUTPATIENT)
Dept: CARE COORDINATION | Facility: CLINIC | Age: 44
End: 2024-09-04

## 2024-09-04 NOTE — PROGRESS NOTES
Patricia was not available when called for our scheduled call time today. Was not able to get through using cell phone number so called and lvm on home phone. Requested return call to reschedule and left this RD contact number [phone].

## 2024-09-13 ENCOUNTER — PATIENT OUTREACH (OUTPATIENT)
Dept: CARE COORDINATION | Facility: CLINIC | Age: 44
End: 2024-09-13
Payer: COMMERCIAL

## 2024-09-13 NOTE — PROGRESS NOTES
Called and lvm with this educator's contact number [phone] for Patricia to see if she would like to reschedule our call she missed earlier this month. Also, sent an email.

## 2024-09-18 ENCOUNTER — APPOINTMENT (OUTPATIENT)
Dept: ENDOCRINOLOGY | Facility: HOSPITAL | Age: 44
End: 2024-09-18
Payer: COMMERCIAL

## 2024-10-01 ENCOUNTER — DOCUMENTATION (OUTPATIENT)
Dept: CARE COORDINATION | Facility: CLINIC | Age: 44
End: 2024-10-01

## 2024-10-01 ENCOUNTER — APPOINTMENT (OUTPATIENT)
Dept: PRIMARY CARE | Facility: CLINIC | Age: 44
End: 2024-10-01
Payer: COMMERCIAL

## 2024-10-01 VITALS
WEIGHT: 192.5 LBS | DIASTOLIC BLOOD PRESSURE: 87 MMHG | SYSTOLIC BLOOD PRESSURE: 137 MMHG | OXYGEN SATURATION: 100 % | BODY MASS INDEX: 30.21 KG/M2 | HEART RATE: 106 BPM | TEMPERATURE: 98.4 F | HEIGHT: 67 IN

## 2024-10-01 DIAGNOSIS — I10 PRIMARY HYPERTENSION: Primary | ICD-10-CM

## 2024-10-01 DIAGNOSIS — R42 DIZZINESS: ICD-10-CM

## 2024-10-01 DIAGNOSIS — R30.0 DYSURIA: ICD-10-CM

## 2024-10-01 DIAGNOSIS — Z00.00 HEALTH MAINTENANCE EXAMINATION: ICD-10-CM

## 2024-10-01 DIAGNOSIS — B37.9 YEAST INFECTION: ICD-10-CM

## 2024-10-01 RX ORDER — CALCIUM CARBONATE 750 MG/1
1 TABLET, CHEWABLE ORAL DAILY
Qty: 1 KIT | Refills: 0 | Status: SHIPPED | OUTPATIENT
Start: 2024-10-01

## 2024-10-01 RX ORDER — FLUCONAZOLE 150 MG/1
150 TABLET ORAL ONCE
Qty: 1 TABLET | Refills: 1 | Status: SHIPPED | OUTPATIENT
Start: 2024-10-01 | End: 2024-10-01

## 2024-10-01 ASSESSMENT — PAIN SCALES - GENERAL: PAINLEVEL: 10-WORST PAIN EVER

## 2024-10-01 NOTE — PROGRESS NOTES
"Subjective   Patient ID: Patricia Vieyra is a 43 y.o. female who presents for Diabetes (' I believe I have a yeast infection' per pt).    HPI     # dizziness   # falls   - fell 2 days ago from toilet and from bed. Denies head strike.   - pt states that she gets dizzy as she stands up from a sitting position.   - endorses family history of seizure disorder in cousin.  - States that she often gets nauseous and had NBNB emesis 2 nights ago.  - wakes up confused when she falls.  Endorses urinary incontinence.  -Denies drinking.     # DMII   - hbA1c of 9.1 on 8/2024.  - 12 AM to 12 -176   - 12 PM - 12 AM - 187-226   - no hypoglycemic episodes    # HTN   :: no cuff  - BP today 137/87    # stress   -States that she lost custody of her grandchildren.  Felt that she was worthless and \"better off not in this world.\"  States that she feels much better at this time.  Denies any HI or SI  - tomorrow appointment with psych and therapist    # full body pains   - feet, hands, under scapula on right.   - does not remember how she felll.   - takes gabapentin  300 mg TID with some relief.    Review of Systems  12 point ROS negative except as stated in HPI.     Objective   /85 (BP Location: Right arm, Patient Position: Sitting)   Pulse 104   Temp 36.9 °C (98.4 °F) (Temporal)   Ht 1.702 m (5' 7\")   Wt 87.3 kg (192 lb 8 oz)   SpO2 100%   BMI 30.15 kg/m²     Physical Exam  Gen: NAD, nontox  HEENT: NCAT. MMM.  RESP: no resp distress, talks in full sentences, CTABL  CVS: non-tachycardic, RRR  ABD: Soft, non-distended  Psych: appropriately answers questions. normal mood and affect  MSK: appears to have Full range of motion on all extremities.  : excoriations, dry blood around vaginal introitus     Assessment/Plan   ROMEL Vieyra is a 42 yo F w/ PMHx of IDDM, severe peripheral neuropathy 2/2 IDDM, and HTN who presents to the clinic for blood pressure follow-up.  Patient states that she never got a cough in order to get her home " BP recordings.  Patient also complains of new onset dizziness with standing.  She states that she has had multiple falls from bed and toilet.  Denies head strike.  Patient had positive orthostatic hypotension.  States that she takes adequate hydration.  Patient also complains of postictal.  After falls and urinary incontinence.  Patient will be referred to neurology.  Patient also complains of concern for yeast infection.  She states that severe pruritus.  She states that when she urinates she has burning associated with these excoriations.  However will obtain UA in order to rule out infection ISO urinary continence and burning with urination.    #HTN  :: BP I/O of 137/87.  - Ordered home BP monitor.  Counseled patient to obtain home BP readings.    #Concern for yeast infection  :: On  exam patient has excoriations with dried blood ISO current menses.  - Ordered Diflucan.  Patient states that she gets recurrent yeast infections.  Gave patient 1 refill.  Of note patient is on Jardiance 10 mg.  Will consider stopping at next appointment.  - Obtain UA.    #Dizziness  :: Orthostatic vitals positive.  Patient ensures that she has adequate hydration.  - Referral to neurology.  - Obtain CBC ISO previous anemia.  - Obtain CMP.    Rtc in 3 months to follow-up on dizziness.    Seen and discussed with Dr. Gabriella Parra MD, MPH   Family Medicine and Preventive Health, PGY-3

## 2024-10-01 NOTE — PATIENT INSTRUCTIONS
Thank you for coming in to see us today, Ms. Patricia Vieyra! It was a pleasure managing your health together.    Today in clinic, we discussed recurrent yeast infections. I will prescribe you diflucan for the yeast infection. Please eat more yogurt ( especially more greek yogurt).     I will order ibuprufoen 800 mg BID       If we ordered bloodwork today, you can get this done at ANY  location and the results will come to me directly. The [facility] here at [facility] are walk-in (no appointment needed); [facility]'s hours are M-F 6:30a-6p and Sat 8a-12p.    If you have any questions/concerns, you can always use [employer] to message me directly. Or if you prefer, you can call the office at [phone]; if you leave a message, the office staff should translate this to a message in my inbox.    Best,  Dr. Parra

## 2024-10-02 DIAGNOSIS — S39.92XD INJURY OF BACK, SUBSEQUENT ENCOUNTER: Primary | ICD-10-CM

## 2024-10-02 RX ORDER — IBUPROFEN 800 MG/1
800 TABLET ORAL 3 TIMES DAILY PRN
Qty: 180 TABLET | Refills: 3 | Status: SHIPPED | OUTPATIENT
Start: 2024-10-02 | End: 2025-10-02

## 2024-10-03 ENCOUNTER — APPOINTMENT (OUTPATIENT)
Dept: OPHTHALMOLOGY | Facility: CLINIC | Age: 44
End: 2024-10-03
Payer: COMMERCIAL

## 2024-10-03 DIAGNOSIS — H04.123 DRY EYES, BILATERAL: ICD-10-CM

## 2024-10-03 DIAGNOSIS — Z79.4 TYPE 2 DIABETES MELLITUS WITH BOTH EYES AFFECTED BY MILD NONPROLIFERATIVE RETINOPATHY WITHOUT MACULAR EDEMA, WITH LONG-TERM CURRENT USE OF INSULIN: Primary | ICD-10-CM

## 2024-10-03 DIAGNOSIS — E11.3293 TYPE 2 DIABETES MELLITUS WITH BOTH EYES AFFECTED BY MILD NONPROLIFERATIVE RETINOPATHY WITHOUT MACULAR EDEMA, WITH LONG-TERM CURRENT USE OF INSULIN: Primary | ICD-10-CM

## 2024-10-03 PROCEDURE — 92134 CPTRZ OPH DX IMG PST SGM RTA: CPT | Performed by: OPHTHALMOLOGY

## 2024-10-03 PROCEDURE — 99213 OFFICE O/P EST LOW 20 MIN: CPT | Performed by: OPHTHALMOLOGY

## 2024-10-03 RX ORDER — ZINC GLUCONATE 10 MG
1 LOZENGE ORAL 4 TIMES DAILY PRN
Qty: 5 ML | Refills: 3 | Status: SHIPPED | OUTPATIENT
Start: 2024-10-03 | End: 2024-11-02

## 2024-10-03 ASSESSMENT — VISUAL ACUITY
OS_SC: 20/600
METHOD: SNELLEN - LINEAR
OD_SC: 20/800

## 2024-10-03 ASSESSMENT — TONOMETRY
OD_IOP_MMHG: 18
OS_IOP_MMHG: 18
IOP_METHOD: GOLDMANN APPLANATION

## 2024-10-03 NOTE — PROGRESS NOTES
Subjective   Patient ID: Patricia Vieyra is a 43 y.o. female.    Chief Complaint    Follow-up; Dry Eye       HPI       Dry Eye    In both eyes.  Associated signs and symptoms include eye pain, irritation, tearing, itching and light sensitivity.             Comments    Patient presents for retinal Consult per Dr. Carcamo NPDR (nonproliferative diabetic retinopathy,Hx of Primary open angle glaucoma,type 2 diabetes mellitus. . Double vision about 3 years. No flashes.no floaters. GTTS: dorzolamide-timoloL (Cosopt)          Last edited by DELMA Huertas on 10/3/2024  2:39 PM.        Current Outpatient Medications (Ophthalmology pharm classes)   Medication Sig Dispense Refill    dorzolamide-timoloL (Cosopt) 22.3-6.8 mg/mL ophthalmic solution Administer 1 drop into both eyes 2 times a day. 15 mL 3    peg 400-propylene glycol (Lubricant Eye, PG-,) 0.4-0.3 % drops ophthalmic drops Administer 1 drop into both eyes 4 times a day as needed (as needed for dry eyes). 5 mL 3     Current Outpatient Medications (Other)   Medication Sig Dispense Refill    alcohol swabs (Pro Comfort Alcohol Pads) pads, medicated Apply 1 each topically once daily. 100 each 11    aspirin 81 mg EC tablet Take 1 tablet (81 mg) by mouth once daily. 30 tablet 11    atorvastatin (Lipitor) 40 mg tablet Take 1 tablet (40 mg) by mouth once daily. 90 tablet 3    blood pressure test kit-medium kit 1 kit once daily. 1 kit 0    blood-glucose meter ([facility]ocate Blood Glucose Monitor) misc 1 each 3 times a day. Please check blood sugars three time a day 1 each 0    diaper,brief,adult,disposable (Depend Underwear For Women Lrg) misc 1 each once daily. Use as needed for bowel and bladder incontinence 80 each 11    dulaglutide (Trulicity) 0.75 mg/0.5 mL pen injector Inject 1.5 mg under the skin 1 (one) time per week. 2 mL 2    empagliflozin (Jardiance) 10 mg Take 1 tablet (10 mg) by mouth once daily. 30 tablet 11    FreeStyle Avila 3 [place] misc Use  "as instructed 1 each 0    FreeStyle Avila 3 Sensor device Apply to skin 90 each 11    gabapentin (Neurontin) 800 mg tablet Take 1 tablet (800 mg) by mouth 3 times a day. 270 tablet 1    hydrocortisone 1 % ointment Apply topically 2 times a day. Apply to chest 56 g 3    ibuprofen 800 mg tablet Take 1 tablet (800 mg) by mouth 3 times a day as needed for mild pain (1 - 3) (pain). 180 tablet 3    insulin lispro (HumaLOG Tempo Pen) 100 unit/mL injection Inject 5 Units under the skin 3 times daily (morning, midday, late afternoon). 15 mL 11    lancets (Easy Comfort Lancets) 30 gauge misc TWICE DAILY 204 each 3    Lantus U-100 Insulin 100 unit/mL injection INJECT 75 UNITS UNDER THE SKIN ONCE DAILY AT BEDTIME. TAKE AS DIRECTED PER INSULIN INSTRUCTIONS 20 mL 3    lidocaine (Lidoderm) 5 % patch PLACE 1 PATCH OVER 12 HOURS ON THE SKIN ONCE DAILY. APPLY TO PAINFUL AREA 12 HOURS PER DAY, REMOVE FOR 12 HOURS. 30 patch 11    lidocaine (Xylocaine) 5 % ointment Apply 1 Application topically if needed for mild pain (1 - 3). 50 g 3    lisinopril 10 mg tablet Take 1 tablet (10 mg) by mouth once daily. 90 tablet 3    miscellaneous medical supply misc 1 each once daily as needed (use during showers). Please use shower chair as needed 1 each 0    miscellaneous medical supply misc 1 each once daily as needed (use with ambulation). Please use with ambulation 1 each 0    miscellaneous medical supply misc 1 each once daily as needed (use at bedside). Use daily as needed at bedside 1 each 0    pen needle, diabetic 31 gauge x 5/16\" needle Use to inject 1-4 times daily as directed. 100 each 11    nicotine (Nicoderm CQ) 14 mg/24 hr patch Place 1 patch over 24 hours on the skin once every 24 hours. 30 patch 0    nicotine polacrilex (Nicorette) 4 mg gum Chew 1 each (4 mg) if needed for smoking cessation. 100 each 0       Objective   Base Eye Exam       Visual Acuity (Snellen - Linear)         Right Left    Dist sc 20/800 20/600    Dist ph sc NI NI "              Tonometry (Goldmann Applanation, 2:46 PM)         Right Left    Pressure 18 18              Pupils         Pupils    Right PERRL, No APD    Left PERRL, No APD              Neuro/Psych       Oriented x3: Yes              Dilation       Both eyes: 1% Mydriacyl & 2.5% Saad  @ 2:46 PM                  Slit Lamp and Fundus Exam       External Exam         Right Left    External Normal Normal              Slit Lamp Exam         Right Left    Lids/Lashes Good Position Good Position    Conjunctiva/Sclera White and quiet White and quiet    Cornea Clear Clear    Anterior Chamber Deep and quiet Deep and quiet    Iris Round and reactive, no NVD Round and reactive, no NVD    Lens 1NS, tr vacuoles 1+ NS, tr vacuoles    Anterior Vitreous Clear Clear              Fundus Exam         Right Left    Disc Pink and sharp, no NVD, temporal sloping Pink and sharp, no NVD, temporal sloping    C/D Ratio .4 .4    Macula MAs MAs    Vessels AV nicking, few dot heme AV nicking, few dot heme    Periphery Normal Normal                  Imaging    Macula OCT 10/03/24  Right eye (OD): Normal contour and appearance, trace IRF outside central macula  Left eye (OS): Normal contour and appearance, trace IRF outside central macula    Assessment/Plan       Referral from Dr. Carcamo, Noticed vision has been getting worse for some time, Lots of burning pain both eyes, Double vision in morning and late night binocular and monocular, Using Cosopt BID both eyes    #Mild non-proliferative diabetic retinopathy (NPDR) both eyes with DME  -Very trace IRF both eyes, mild non-proliferative diabetic retinopathy (NPDR), but would not expect these to be affecting visual acuity (VA)  -Unclear why visual acuity (VA) so limited  -If glaucoma eval as below does not explain visual acuity (VA), may consider Neuro-ophth, given lack of ocular findings to explain visual acuity (VA)?  -3 months me    #Dry eyes both eyes  -Some symptoms consistent with dry eyes, will  start ATs     #primary open angle glaucoma (POAG) OU  Hx of glaucoma, on drops, appears glaucoma eval recommended by Dr. Carcamo but does not appear s

## 2024-10-06 ASSESSMENT — SLIT LAMP EXAM - LIDS
COMMENTS: GOOD POSITION
COMMENTS: GOOD POSITION

## 2024-10-06 ASSESSMENT — EXTERNAL EXAM - LEFT EYE: OS_EXAM: NORMAL

## 2024-10-06 ASSESSMENT — CUP TO DISC RATIO
OS_RATIO: .4
OD_RATIO: .4

## 2024-10-06 ASSESSMENT — EXTERNAL EXAM - RIGHT EYE: OD_EXAM: NORMAL

## 2024-10-07 DIAGNOSIS — B37.9 YEAST INFECTION: ICD-10-CM

## 2024-10-07 DIAGNOSIS — R51.9 NONINTRACTABLE HEADACHE, UNSPECIFIED CHRONICITY PATTERN, UNSPECIFIED HEADACHE TYPE: Primary | ICD-10-CM

## 2024-10-07 DIAGNOSIS — S39.92XS INJURY OF BACK, SEQUELA: ICD-10-CM

## 2024-10-07 RX ORDER — NYSTATIN 100000 U/G
CREAM TOPICAL 2 TIMES DAILY
Qty: 30 G | Refills: 0 | Status: SHIPPED | OUTPATIENT
Start: 2024-10-07 | End: 2024-10-14

## 2024-10-07 RX ORDER — IBUPROFEN 800 MG/1
800 TABLET ORAL 3 TIMES DAILY PRN
Qty: 180 TABLET | Refills: 3 | Status: SHIPPED | OUTPATIENT
Start: 2024-10-07 | End: 2025-10-07

## 2024-10-08 ENCOUNTER — HOSPITAL ENCOUNTER (EMERGENCY)
Facility: HOSPITAL | Age: 44
Discharge: HOME | End: 2024-10-08
Payer: MEDICAID

## 2024-10-08 ENCOUNTER — CLINICAL SUPPORT (OUTPATIENT)
Dept: EMERGENCY MEDICINE | Facility: HOSPITAL | Age: 44
End: 2024-10-08
Payer: MEDICAID

## 2024-10-08 VITALS
DIASTOLIC BLOOD PRESSURE: 82 MMHG | TEMPERATURE: 98.2 F | RESPIRATION RATE: 18 BRPM | SYSTOLIC BLOOD PRESSURE: 124 MMHG | OXYGEN SATURATION: 97 % | HEART RATE: 94 BPM

## 2024-10-08 LAB
ATRIAL RATE: 94 BPM
P AXIS: 71 DEGREES
P OFFSET: 186 MS
P ONSET: 138 MS
PR INTERVAL: 172 MS
Q ONSET: 224 MS
QRS COUNT: 16 BEATS
QRS DURATION: 74 MS
QT INTERVAL: 364 MS
QTC CALCULATION(BAZETT): 455 MS
QTC FREDERICIA: 422 MS
R AXIS: 61 DEGREES
T AXIS: 77 DEGREES
T OFFSET: 406 MS
VENTRICULAR RATE: 94 BPM

## 2024-10-08 PROCEDURE — 93005 ELECTROCARDIOGRAM TRACING: CPT

## 2024-10-08 PROCEDURE — 99281 EMR DPT VST MAYX REQ PHY/QHP: CPT

## 2024-10-08 NOTE — ED TRIAGE NOTES
"SOB and CP x \"months.\" Was seen by here PCP and given ibuprofen which she states is not working. Pt states pain radiates to back and arm.     WOB WNL, LS clear, spo2 WNL, speaking in full sentences.   "

## 2024-10-14 ENCOUNTER — APPOINTMENT (OUTPATIENT)
Dept: ENDOCRINOLOGY | Facility: CLINIC | Age: 44
End: 2024-10-14
Payer: COMMERCIAL

## 2024-10-14 DIAGNOSIS — G89.29 OTHER CHRONIC PAIN: Primary | ICD-10-CM

## 2024-10-14 RX ORDER — CYCLOBENZAPRINE HCL 10 MG
10 TABLET ORAL NIGHTLY PRN
Qty: 30 TABLET | Refills: 0 | Status: SHIPPED | OUTPATIENT
Start: 2024-10-14 | End: 2024-12-13

## 2024-10-16 ENCOUNTER — APPOINTMENT (OUTPATIENT)
Dept: NEUROLOGY | Facility: HOSPITAL | Age: 44
End: 2024-10-16
Payer: COMMERCIAL

## 2024-10-29 ENCOUNTER — TELEPHONE (OUTPATIENT)
Dept: PRIMARY CARE | Facility: CLINIC | Age: 44
End: 2024-10-29
Payer: COMMERCIAL

## 2024-10-29 NOTE — TELEPHONE ENCOUNTER
Hyun the  for patient called in and stated that the order for a hospital bed hasn't been received by [facility] .Please  fax order to [phone]. Thanks!

## 2024-10-30 ENCOUNTER — HOSPITAL ENCOUNTER (OUTPATIENT)
Dept: RESPIRATORY THERAPY | Facility: HOSPITAL | Age: 44
Discharge: HOME | End: 2024-10-30
Payer: COMMERCIAL

## 2024-10-30 ENCOUNTER — LAB (OUTPATIENT)
Dept: LAB | Facility: LAB | Age: 44
End: 2024-10-30
Payer: MEDICAID

## 2024-10-30 DIAGNOSIS — R30.0 DYSURIA: ICD-10-CM

## 2024-10-30 DIAGNOSIS — S39.92XS INJURY OF BACK, SEQUELA: ICD-10-CM

## 2024-10-30 DIAGNOSIS — G60.9 IDIOPATHIC PERIPHERAL NEUROPATHY: ICD-10-CM
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
## 2024-10-30 DIAGNOSIS — R42 DIZZINESS: ICD-10-CM
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
## 2024-10-30 DIAGNOSIS — R06.02 SOB (SHORTNESS OF BREATH): ICD-10-CM

## 2024-10-30 LAB
ALBUMIN SERPL BCP-MCNC: 4.3 G/DL (ref 3.4–5)
ALP SERPL-CCNC: 77 U/L (ref 33–110)
ALT SERPL W P-5'-P-CCNC: 10 U/L (ref 7–45)
ANION GAP SERPL CALC-SCNC: 10 MMOL/L (ref 10–20)
APPEARANCE UR: ABNORMAL
AST SERPL W P-5'-P-CCNC: 11 U/L (ref 9–39)
BASOPHILS # BLD AUTO: 0.09 X10*3/UL (ref 0–0.1)
BASOPHILS NFR BLD AUTO: 1.2 %
BILIRUB SERPL-MCNC: 0.4 MG/DL (ref 0–1.2)
BILIRUB UR STRIP.AUTO-MCNC: NEGATIVE MG/DL
BUN SERPL-MCNC: 8 MG/DL (ref 6–23)
CALCIUM SERPL-MCNC: 9.2 MG/DL (ref 8.6–10.6)
CHLORIDE SERPL-SCNC: 103 MMOL/L (ref 98–107)
CO2 SERPL-SCNC: 26 MMOL/L (ref 21–32)
COLOR UR: ABNORMAL
CREAT SERPL-MCNC: 1.02 MG/DL (ref 0.5–1.05)
EGFRCR SERPLBLD CKD-EPI 2021: 70 ML/MIN/1.73M*2
EOSINOPHIL # BLD AUTO: 0.08 X10*3/UL (ref 0–0.7)
EOSINOPHIL NFR BLD AUTO: 1.1 %
ERYTHROCYTE [DISTWIDTH] IN BLOOD BY AUTOMATED COUNT: 18.5 % (ref 11.5–14.5)
GLUCOSE SERPL-MCNC: 231 MG/DL (ref 74–99)
GLUCOSE UR STRIP.AUTO-MCNC: ABNORMAL MG/DL
HCT VFR BLD AUTO: 35.1 % (ref 36–46)
HGB BLD-MCNC: 10.9 G/DL (ref 12–16)
IMM GRANULOCYTES # BLD AUTO: 0.01 X10*3/UL (ref 0–0.7)
IMM GRANULOCYTES NFR BLD AUTO: 0.1 % (ref 0–0.9)
KETONES UR STRIP.AUTO-MCNC: NEGATIVE MG/DL
LEUKOCYTE ESTERASE UR QL STRIP.AUTO: ABNORMAL
LYMPHOCYTES # BLD AUTO: 3.14 X10*3/UL (ref 1.2–4.8)
LYMPHOCYTES NFR BLD AUTO: 43.4 %
MCH RBC QN AUTO: 24.8 PG (ref 26–34)
MCHC RBC AUTO-ENTMCNC: 31.1 G/DL (ref 32–36)
MCV RBC AUTO: 80 FL (ref 80–100)
MGC ASCENT PFT - FEV1 - POST: 2.27
MGC ASCENT PFT - FEV1 - PRE: 2.02
MGC ASCENT PFT - FEV1 - PREDICTED: 3.07
MGC ASCENT PFT - FVC - POST: 3.39
MGC ASCENT PFT - FVC - PRE: 3.08
MGC ASCENT PFT - FVC - PREDICTED: 3.77
MONOCYTES # BLD AUTO: 0.81 X10*3/UL (ref 0.1–1)
MONOCYTES NFR BLD AUTO: 11.2 %
MUCOUS THREADS #/AREA URNS AUTO: ABNORMAL /LPF
NEUTROPHILS # BLD AUTO: 3.11 X10*3/UL (ref 1.2–7.7)
NEUTROPHILS NFR BLD AUTO: 43 %
NITRITE UR QL STRIP.AUTO: ABNORMAL
NRBC BLD-RTO: 0 /100 WBCS (ref 0–0)
PH UR STRIP.AUTO: 6.5 [PH]
PLATELET # BLD AUTO: 346 X10*3/UL (ref 150–450)
POTASSIUM SERPL-SCNC: 4.1 MMOL/L (ref 3.5–5.3)
PROT SERPL-MCNC: 6.9 G/DL (ref 6.4–8.2)
PROT UR STRIP.AUTO-MCNC: ABNORMAL MG/DL
RBC # BLD AUTO: 4.39 X10*6/UL (ref 4–5.2)
RBC # UR STRIP.AUTO: NEGATIVE /UL
RBC #/AREA URNS AUTO: ABNORMAL /HPF
SODIUM SERPL-SCNC: 135 MMOL/L (ref 136–145)
SP GR UR STRIP.AUTO: 1.04
SQUAMOUS #/AREA URNS AUTO: ABNORMAL /HPF
UROBILINOGEN UR STRIP.AUTO-MCNC: NORMAL MG/DL
WBC # BLD AUTO: 7.2 X10*3/UL (ref 4.4–11.3)
WBC #/AREA URNS AUTO: ABNORMAL /HPF
YEAST BUDDING #/AREA UR COMP ASSIST: PRESENT /HPF

## 2024-10-30 PROCEDURE — 36415 COLL VENOUS BLD VENIPUNCTURE: CPT

## 2024-10-30 PROCEDURE — 94729 DIFFUSING CAPACITY: CPT | Performed by: STUDENT IN AN ORGANIZED HEALTH CARE EDUCATION/TRAINING PROGRAM

## 2024-10-30 PROCEDURE — 94726 PLETHYSMOGRAPHY LUNG VOLUMES: CPT | Performed by: STUDENT IN AN ORGANIZED HEALTH CARE EDUCATION/TRAINING PROGRAM

## 2024-10-30 PROCEDURE — 94060 EVALUATION OF WHEEZING: CPT | Performed by: STUDENT IN AN ORGANIZED HEALTH CARE EDUCATION/TRAINING PROGRAM

## 2024-10-30 PROCEDURE — 85025 COMPLETE CBC W/AUTO DIFF WBC: CPT

## 2024-10-30 PROCEDURE — 80053 COMPREHEN METABOLIC PANEL: CPT

## 2024-10-30 PROCEDURE — 81001 URINALYSIS AUTO W/SCOPE: CPT

## 2024-10-30 PROCEDURE — 94726 PLETHYSMOGRAPHY LUNG VOLUMES: CPT

## 2024-10-31 ENCOUNTER — CLINICAL SUPPORT (OUTPATIENT)
Dept: NUTRITION | Facility: HOSPITAL | Age: 44
End: 2024-10-31
Payer: COMMERCIAL

## 2024-11-05 ENCOUNTER — HOSPITAL ENCOUNTER (OUTPATIENT)
Dept: CARDIOLOGY | Facility: HOSPITAL | Age: 44
Discharge: HOME | End: 2024-11-05
Payer: COMMERCIAL

## 2024-11-05 DIAGNOSIS — R06.09 DYSPNEA ON EXERTION: ICD-10-CM

## 2024-11-05 DIAGNOSIS — R06.02 SOB (SHORTNESS OF BREATH): ICD-10-CM

## 2024-11-05 DIAGNOSIS — R06.09 DYSPNEA ON EXERTION: Primary | ICD-10-CM

## 2024-11-05 LAB
AORTIC VALVE PEAK VELOCITY: 1.17 M/S
AV PEAK GRADIENT: 5 MMHG
AVA (PEAK VEL): 3.24 CM2
EJECTION FRACTION APICAL 4 CHAMBER: 54.6
EJECTION FRACTION: 63 %
LEFT VENTRICLE INTERNAL DIMENSION DIASTOLE: 3.62 CM (ref 3.5–6)
LEFT VENTRICULAR OUTFLOW TRACT DIAMETER: 2.34 CM
MITRAL VALVE E/A RATIO: 0.86
RIGHT VENTRICLE FREE WALL PEAK S': 12 CM/S
RIGHT VENTRICLE PEAK SYSTOLIC PRESSURE: 20.7 MMHG
TRICUSPID ANNULAR PLANE SYSTOLIC EXCURSION: 1.8 CM

## 2024-11-05 PROCEDURE — 93306 TTE W/DOPPLER COMPLETE: CPT

## 2024-11-05 PROCEDURE — 93306 TTE W/DOPPLER COMPLETE: CPT | Performed by: INTERNAL MEDICINE

## 2024-11-12 ENCOUNTER — HOSPITAL ENCOUNTER (OUTPATIENT)
Dept: RADIOLOGY | Facility: HOSPITAL | Age: 44
Discharge: HOME | End: 2024-11-12
Payer: COMMERCIAL

## 2024-11-12 VITALS — HEIGHT: 67 IN | WEIGHT: 176 LBS | BODY MASS INDEX: 27.62 KG/M2

## 2024-11-12 DIAGNOSIS — N39.0 URINARY TRACT INFECTION WITHOUT HEMATURIA, SITE UNSPECIFIED: Primary | ICD-10-CM

## 2024-11-12 DIAGNOSIS — Z12.31 ENCOUNTER FOR SCREENING MAMMOGRAM FOR MALIGNANT NEOPLASM OF BREAST: ICD-10-CM

## 2024-11-12 PROCEDURE — 77067 SCR MAMMO BI INCL CAD: CPT

## 2024-11-12 PROCEDURE — 77063 BREAST TOMOSYNTHESIS BI: CPT | Performed by: STUDENT IN AN ORGANIZED HEALTH CARE EDUCATION/TRAINING PROGRAM

## 2024-11-12 PROCEDURE — 77067 SCR MAMMO BI INCL CAD: CPT | Performed by: STUDENT IN AN ORGANIZED HEALTH CARE EDUCATION/TRAINING PROGRAM

## 2024-11-12 RX ORDER — NITROFURANTOIN 25; 75 MG/1; MG/1
100 CAPSULE ORAL 2 TIMES DAILY
Qty: 10 CAPSULE | Refills: 0 | Status: SHIPPED | OUTPATIENT
Start: 2024-11-12 | End: 2024-11-18

## 2024-11-22 ENCOUNTER — APPOINTMENT (OUTPATIENT)
Dept: PRIMARY CARE | Facility: CLINIC | Age: 44
End: 2024-11-22
Payer: MEDICAID

## 2024-11-22 VITALS
TEMPERATURE: 96.8 F | HEART RATE: 98 BPM | BODY MASS INDEX: 27.65 KG/M2 | DIASTOLIC BLOOD PRESSURE: 86 MMHG | OXYGEN SATURATION: 98 % | WEIGHT: 176.2 LBS | HEIGHT: 67 IN | SYSTOLIC BLOOD PRESSURE: 123 MMHG

## 2024-11-22 DIAGNOSIS — E11.40 DIABETIC NEUROPATHY, PAINFUL (MULTI): ICD-10-CM

## 2024-11-22 DIAGNOSIS — Z00.00 HEALTHCARE MAINTENANCE: ICD-10-CM

## 2024-11-22 DIAGNOSIS — R51.9 NONINTRACTABLE HEADACHE, UNSPECIFIED CHRONICITY PATTERN, UNSPECIFIED HEADACHE TYPE: Primary | ICD-10-CM

## 2024-11-22 DIAGNOSIS — Z79.4 INSULIN DEPENDENT TYPE 2 DIABETES MELLITUS (MULTI): ICD-10-CM

## 2024-11-22 DIAGNOSIS — R32 INCONTINENCE IN FEMALE: ICD-10-CM

## 2024-11-22 DIAGNOSIS — R13.10 DYSPHAGIA, UNSPECIFIED TYPE: ICD-10-CM

## 2024-11-22 DIAGNOSIS — E11.9 INSULIN DEPENDENT TYPE 2 DIABETES MELLITUS (MULTI): ICD-10-CM

## 2024-11-22 DIAGNOSIS — I10 PRIMARY HYPERTENSION: ICD-10-CM

## 2024-11-22 DIAGNOSIS — J41.0 SIMPLE CHRONIC BRONCHITIS (MULTI): ICD-10-CM
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
## 2024-11-22 DIAGNOSIS — E11.69 TYPE 2 DIABETES MELLITUS WITH OTHER SPECIFIED COMPLICATION, WITH LONG-TERM CURRENT USE OF INSULIN: ICD-10-CM

## 2024-11-22 DIAGNOSIS — Z79.4 TYPE 2 DIABETES MELLITUS WITH OTHER SPECIFIED COMPLICATION, WITH LONG-TERM CURRENT USE OF INSULIN: ICD-10-CM

## 2024-11-22 DIAGNOSIS — L02.92 BOILS: ICD-10-CM

## 2024-11-22 DIAGNOSIS — H40.1132 PRIMARY OPEN ANGLE GLAUCOMA (POAG) OF BOTH EYES, MODERATE STAGE: ICD-10-CM

## 2024-11-22 DIAGNOSIS — F17.200 TOBACCO USE DISORDER: ICD-10-CM

## 2024-11-22 DIAGNOSIS — S39.92XD INJURY OF BACK, SUBSEQUENT ENCOUNTER: ICD-10-CM

## 2024-11-22 RX ORDER — DIPHENHYDRAMINE HCL 25 MG
4 CAPSULE ORAL AS NEEDED
Qty: 100 EACH | Refills: 0 | Status: SHIPPED | OUTPATIENT
Start: 2024-11-22 | End: 2024-12-22

## 2024-11-22 RX ORDER — VARENICLINE TARTRATE 1 MG/1
1 TABLET, FILM COATED ORAL 2 TIMES DAILY
Qty: 60 TABLET | Refills: 2 | Status: SHIPPED | OUTPATIENT
Start: 2024-11-22 | End: 2025-02-20

## 2024-11-22 RX ORDER — DIAPER,BRIEF,ADULT, DISPOSABLE
1 EACH MISCELLANEOUS DAILY
Qty: 280 EACH | Refills: 11 | Status: SHIPPED | OUTPATIENT
Start: 2024-11-22

## 2024-11-22 RX ORDER — BLOOD-GLUCOSE,RECEIVER,CONT
EACH MISCELLANEOUS
Qty: 1 EACH | Refills: 0 | Status: SHIPPED | OUTPATIENT
Start: 2024-11-22

## 2024-11-22 RX ORDER — GABAPENTIN 800 MG/1
800 TABLET ORAL 3 TIMES DAILY
Qty: 270 TABLET | Refills: 11 | Status: SHIPPED | OUTPATIENT
Start: 2024-11-22

## 2024-11-22 RX ORDER — DEXTROSE 4 G
1 TABLET,CHEWABLE ORAL 3 TIMES DAILY
Qty: 1 EACH | Refills: 0 | Status: SHIPPED | OUTPATIENT
Start: 2024-11-22

## 2024-11-22 RX ORDER — METFORMIN HYDROCHLORIDE 500 MG/1
1000 TABLET, EXTENDED RELEASE ORAL
Qty: 800 TABLET | Refills: 0 | Status: SHIPPED | OUTPATIENT
Start: 2024-11-22 | End: 2025-12-27

## 2024-11-22 RX ORDER — IBUPROFEN 200 MG
1 TABLET ORAL EVERY 24 HOURS
Qty: 30 PATCH | Refills: 11 | Status: SHIPPED | OUTPATIENT
Start: 2024-11-22 | End: 2024-12-22

## 2024-11-22 RX ORDER — ALBUTEROL SULFATE 90 UG/1
2 INHALANT RESPIRATORY (INHALATION) EVERY 4 HOURS PRN
Qty: 6.7 G | Refills: 5 | Status: SHIPPED | OUTPATIENT
Start: 2024-11-22 | End: 2025-11-22

## 2024-11-22 RX ORDER — DORZOLAMIDE HYDROCHLORIDE AND TIMOLOL MALEATE 20; 5 MG/ML; MG/ML
1 SOLUTION/ DROPS OPHTHALMIC 2 TIMES DAILY
Qty: 15 ML | Refills: 11 | Status: SHIPPED | OUTPATIENT
Start: 2024-11-22 | End: 2025-11-22

## 2024-11-22 RX ORDER — CALCIUM CARBONATE 750 MG/1
1 TABLET, CHEWABLE ORAL DAILY
Qty: 1 KIT | Refills: 0 | Status: SHIPPED | OUTPATIENT
Start: 2024-11-22

## 2024-11-22 RX ORDER — FLUTICASONE PROPIONATE AND SALMETEROL XINAFOATE 45; 21 UG/1; UG/1
2 AEROSOL, METERED RESPIRATORY (INHALATION)
Qty: 12 G | Refills: 11 | Status: SHIPPED | OUTPATIENT
Start: 2024-11-22 | End: 2025-11-22

## 2024-11-22 RX ORDER — ACETAMINOPHEN 500 MG
1000 TABLET ORAL EVERY 6 HOURS PRN
Qty: 30 TABLET | Refills: 0 | Status: SHIPPED | OUTPATIENT
Start: 2024-11-22 | End: 2024-12-02

## 2024-11-22 RX ORDER — LISINOPRIL 10 MG/1
10 TABLET ORAL DAILY
Qty: 90 TABLET | Refills: 11 | Status: SHIPPED | OUTPATIENT
Start: 2024-11-22

## 2024-11-22 RX ORDER — DULAGLUTIDE 0.75 MG/.5ML
3 INJECTION, SOLUTION SUBCUTANEOUS
Qty: 2 ML | Refills: 11 | Status: SHIPPED | OUTPATIENT
Start: 2024-11-24

## 2024-11-22 RX ORDER — IBUPROFEN 800 MG/1
800 TABLET ORAL 3 TIMES DAILY PRN
Qty: 180 TABLET | Refills: 11 | Status: SHIPPED | OUTPATIENT
Start: 2024-11-22 | End: 2025-11-22

## 2024-11-22 ASSESSMENT — PATIENT HEALTH QUESTIONNAIRE - PHQ9
1. LITTLE INTEREST OR PLEASURE IN DOING THINGS: SEVERAL DAYS
9. THOUGHTS THAT YOU WOULD BE BETTER OFF DEAD, OR OF HURTING YOURSELF: SEVERAL DAYS
5. POOR APPETITE OR OVEREATING: NEARLY EVERY DAY
3. TROUBLE FALLING OR STAYING ASLEEP OR SLEEPING TOO MUCH: NEARLY EVERY DAY
2. FEELING DOWN, DEPRESSED OR HOPELESS: MORE THAN HALF THE DAYS
6. FEELING BAD ABOUT YOURSELF - OR THAT YOU ARE A FAILURE OR HAVE LET YOURSELF OR YOUR FAMILY DOWN: MORE THAN HALF THE DAYS
4. FEELING TIRED OR HAVING LITTLE ENERGY: NEARLY EVERY DAY
SUM OF ALL RESPONSES TO PHQ9 QUESTIONS 1 AND 2: 3
10. IF YOU CHECKED OFF ANY PROBLEMS, HOW DIFFICULT HAVE THESE PROBLEMS MADE IT FOR YOU TO DO YOUR WORK, TAKE CARE OF THINGS AT HOME, OR GET ALONG WITH OTHER PEOPLE: EXTREMELY DIFFICULT
SUM OF ALL RESPONSES TO PHQ QUESTIONS 1-9: 19
8. MOVING OR SPEAKING SO SLOWLY THAT OTHER PEOPLE COULD HAVE NOTICED. OR THE OPPOSITE, BEING SO FIGETY OR RESTLESS THAT YOU HAVE BEEN MOVING AROUND A LOT MORE THAN USUAL: MORE THAN HALF THE DAYS
7. TROUBLE CONCENTRATING ON THINGS, SUCH AS READING THE NEWSPAPER OR WATCHING TELEVISION: MORE THAN HALF THE DAYS

## 2024-11-22 ASSESSMENT — PAIN SCALES - GENERAL: PAINLEVEL_OUTOF10: 5

## 2024-11-22 ASSESSMENT — ENCOUNTER SYMPTOMS
LOSS OF SENSATION IN FEET: 1
OCCASIONAL FEELINGS OF UNSTEADINESS: 1
DEPRESSION: 1

## 2024-11-22 NOTE — PROGRESS NOTES
"Subjective   Patient ID: Patricia Vieyra is a 43 y.o. female who presents for Establish Care (Boils and medication refill ).    HPI     # Right Lateral Arm Pain   Reports a fall 1-2 months ago directly onto the floor, resulting in persistent right lateral arm pain. The pain is described as throbbing, with a severity of 10/10. Pain is usually worse at night, lasting all night, and preventing sleep. She has tried patches, gabapentin, and Tylenol without relief.    # Headaches   Frontal headaches lasting up to several days, associated with photophobia and nausea. She reports using ibuprofen up to 8-10 times a week for relief, with partial benefit. No recent changes in headache pattern.    #Urinary Incontinence:   The patient reports a lack of sensation and difficulty controlling urinary function. She has frequent accidents and has difficulty recognizing the need to void. Pt has adult depends for incontinence.     #COPD   Shortness of breath with minimal exertion, such as walking from bed to the bathroom, consistent with her known COPD. No recent exacerbations reported.    #Dysphagia   Difficulty swallowing both liquids and solids. She denies odynophagia or coughing up blood. Notably, the patient underwent glottic lesion resection in 2022, which may be contributing to ongoing swallowing difficulties. Pt sates that dysphagia occurred after the glottic surgery.       Review of Systems  12 point ROS negative except as stated in HPI.       Objective   /86 (BP Location: Right arm, Patient Position: Sitting, BP Cuff Size: Adult)   Pulse 98   Temp 36 °C (96.8 °F) (Temporal)   Ht 1.702 m (5' 7\")   Wt 79.9 kg (176 lb 3.2 oz)   LMP 11/03/2024 (Exact Date)   SpO2 98%   BMI 27.60 kg/m²     Physical Exam  Gen: NAD, nontox  HEENT: NCAT. MMM.  RESP: no resp distress, talks in full sentences, CTABL  CVS: non-tachycardic, RRR  ABD: Soft, non-distended  Psych: appropriately answers questions. normal mood and affect  MSK: " appears to have Full range of motion on all extremities. Tenderness to palpation at right lateral arm.   SKIN: nodular papules 1/2 cm in diameter in perineum and behind ear.     Assessment/Plan    44 yo F w/ PMHx of IDDM, severe peripheral neuropathy 2/2 IDDM, and HTN who presents to the clinic for follow up on her right lateral arm pain. She also endorses other concerns today that is chronic but persistent such as her urinary incontinence likely 2/2 autonomic dysfunction iso uncontrolled IDDM. She also endorses headaches which appears to be consistent with migraine headaches. Pt has had increased SOB with minimal exertion likely secondary to COPD exertion. Pt will is currently on spiriva and albuterol PRN.     Problem List Items Addressed This Visit       Injury of back    Relevant Medications    ibuprofen 800 mg tablet    Dysphagia  S/p glottic lesion excision     Relevant Orders    Referral to ENT    Hypertension  Patient's pharmacy did not carry BP kit so sent to a different pharmacy     Relevant Medications    blood pressure test kit-medium kit      Nonintractable headache, unspecified chronicity pattern, unspecified headache type    -  Primary  :: likely migraine headache due to duration of sx, photophobia, and associated nausea.     Relevant Medications    acetaminophen (Tylenol Extra Strength) 500 mg tablet    Type 2 diabetes mellitus with other specified complication, with long-term current use of insulin      ::   Lab Results   Component Value Date    HGBA1C 9.1 (A) 08/30/2024   :: Pt due for next hbA1c in 1 month.     Relevant Medications    blood-glucose meter (Advocate Blood Glucose Monitor) misc    dulaglutide (Trulicity) 0.75 mg/0.5 mL pen injector (Start on 11/24/2024)    FreeStyle Avila 3 [place] misc    lisinopril 10 mg tablet    metFORMIN XR (Glucophage-XR) 500 mg 24 hr tablet    Other Relevant Orders    Follow Up In Primary Care    Diabetic neuropathy, painful (Multi)        Relevant Medications     blood-glucose meter (Advocate Blood Glucose Monitor) misc    gabapentin (Neurontin) 800 mg tablet    Insulin dependent type 2 diabetes mellitus (Multi)        Relevant Medications    blood-glucose meter (Advocate Blood Glucose Monitor) misc    Primary open angle glaucoma (POAG) of both eyes, moderate stage      :: follows with ophtho   :: uses dorzolamide-timolol     Relevant Medications    dorzolamide-timoloL (Cosopt) 22.3-6.8 mg/mL ophthalmic solution    Incontinence in female      :: likely 2/2 autonomic dysfunction     Relevant Medications    diaper,brief,adult,disposable (Depend Underwear For Women Lrg) misc    Tobacco use disorder      :: pt down to 1 pack/ week from 1 pack/ day   :: little effect with nicotine replacement therapy   - start chantix. Hepatic function panel up to date and wnl.     Relevant Medications    nicotine (Nicoderm CQ) 21 mg/24 hr patch    nicotine polacrilex (Nicorette) 4 mg gum    varenicline (Chantix) 1 mg tablet    Healthcare maintenance      :: iso tobacco use disorder     Relevant Orders    Pneumococcal conjugate vaccine, 20-valent (PREVNAR 20) (Completed)    Simple chronic bronchitis (Multi)      :: pt with increased SOB. Currenty on albuterol PRN and spiriva. Pt using albuterol daily.   - start maintenance inhaler advair.     Relevant Medications    fluticasone propion-salmeteroL (Advair HFA) 45-21 mcg/actuation inhaler    albuterol (Proventil HFA) 90 mcg/actuation inhaler    Fall, initial encounter      :: pt with fall 2 months ago with continued dull pain   :: no relief with lidocaine, ibuprofen, tylenol   - obtain XR imaging to rule out fracture     Relevant Orders    XR humerus right    [facility] to [facility]      :: papular lesions likely HS.     Relevant Orders    Referral to Dermatology          RTC in 1 month to follow up on DMII     Seen and discussed with Dr. Phan Parra MD, MPH   Family Medicine and Preventive Health,  PGY-3

## 2024-11-29 NOTE — PROGRESS NOTES
I saw and evaluated the patient. I personally obtained the key and critical portions of the history and physical exam or was physically present for key and critical portions performed by the resident/fellow. I reviewed the resident/fellow's documentation and discussed the patient with the resident/fellow. I agree with the resident/fellow's medical decision making as documented in the note.  Tobacco cessation advice > 10 minutes and prescription to start Chantix with followup within 4 weeks of quit date.    Mavis Chisholm MD

## 2024-12-02 ENCOUNTER — APPOINTMENT (OUTPATIENT)
Dept: PODIATRY | Facility: HOSPITAL | Age: 44
End: 2024-12-02
Payer: COMMERCIAL

## 2024-12-04 ENCOUNTER — CLINICAL SUPPORT (OUTPATIENT)
Dept: NUTRITION | Facility: HOSPITAL | Age: 44
End: 2024-12-04
Payer: COMMERCIAL

## 2024-12-04 NOTE — PROGRESS NOTES
[employer]  Diet Recommendation 1: Diabetes  Diet Recommendation 2: Healthy Eating  Food Intolerance Avoidance: avoids mushrooms  Household Size: 9 Members (4 Max/Household)  Interventions: Referral Number: 1st 6 Mo Referral 6 Mos  Interventions: Visit Number: 2 of 6 Visits - Max 6 Visits/Referral Each 6 Mo Period  Education Today: Healthy Eating on a Budget  Follow Up Notes for Future Visits: quick food   Grains: 25-50% Whole  Fruit: 25-50% Fresh  Vegetables: Fresh - 100%  Proteins: 0 Plant-based Items  Dairy: 25-50% Lowfat  Originating Site of Referral Order: [facility]  Initials of RD Assisting Today: KALPESH

## 2024-12-09 ENCOUNTER — TELEPHONE (OUTPATIENT)
Dept: PRIMARY CARE | Facility: CLINIC | Age: 44
End: 2024-12-09

## 2024-12-09 NOTE — TELEPHONE ENCOUNTER
Pt called about her hospital bed. She states there was paperwork sent over to you to fill out for this. But the pt said nothing has been done. Please call [phone] thanks!

## 2024-12-12 NOTE — TELEPHONE ENCOUNTER
Patient called and stated that she needs additional imaging for her mammogram, she is also requesting a callback about this matter

## 2024-12-18 DIAGNOSIS — R92.8 MAMMOGRAM ABNORMAL: Primary | ICD-10-CM

## 2024-12-19 NOTE — TELEPHONE ENCOUNTER
Called pt regarding her request for further breast imaging.  Reviewed screening mammogram which shows asymmetry at left breast with recommendation for diagnosting mammogram as well as US. Ordered imaging. Nor able to reach pt so left VM letting pt know that the orders have been placed and she can either mychart message me or call the clinic for a good call back time.

## 2025-01-06 ENCOUNTER — DOCUMENTATION (OUTPATIENT)
Facility: HOSPITAL | Age: 45
End: 2025-01-06
Payer: COMMERCIAL

## 2025-01-06 NOTE — PROGRESS NOTES
"RN contacted me with the folowing epic message:      \"Patient Patricia Monsalve is here and is light headed with her mammogram.  Blood pressure has been low , last at 0845 72/57 pulse 131.   She is sitting and has had water and juice.   She is not SOB sitting , not dizzy sitting.  I recommend her going to the ED, but she would rather not.   She does not like hospitals.  Also her pO2 in low 79-85 are my readings.    I told her I would try to contact you for recommendation \"     Called pt at [facility] who states that she thinks her BP was down this morning because she took her BP meds later at night than she usually does. Pt has BP cuff at home and states that BP was 120/87 the last time she took it and she has not been hypotensive. Pt asymptomatic. Pt encouraged to go to ED if she has chest pain, dyspnea, dizziness. Pt to write down BP readings at home and bring them to clinic on follow up.   "

## 2025-01-08 ENCOUNTER — CLINICAL SUPPORT (OUTPATIENT)
Dept: NUTRITION | Facility: HOSPITAL | Age: 45
End: 2025-01-08
Payer: COMMERCIAL

## 2025-01-08 NOTE — PROGRESS NOTES
[employer]  Diet Recommendation 1: Diabetes  Diet Recommendation 2: Soft (Chewing Difficulty)  Diet Recommendation 3: Healthy Eating  Food Intolerance Avoidance: Mushrooms, coconut  Household Size: 9 Members (4 Max/Household)  Interventions: Referral Number: 1st 6 Mo Referral 6 Mos  Interventions: Visit Number: 3 of 6 Visits - Max 6 Visits/Referral Each 6 Mo Period  Education Today: MyPlate Meals  Follow Up Notes for Future Visits: Limits sugar for diabetes. Veg intake is a bit of a challenge because she can't eat anything crunchy 2/2 teeth  Grains: 0-25% Whole  Fruit: 50-75% Fresh  Vegetables: % Fresh  Proteins: 0 Plant-based Items  Dairy: 0-25% Lowfat  Originating Site of Referral Order: [facility]  Initials of RD Assisting Today: RADHA

## 2025-02-03 NOTE — PROGRESS NOTES
Patricia Vieyra is a 44 y.o. female with past medical history of Bipolar disorder, diabetes who is referred by Dr. Elio Newman for dysphagia.    Microcytic anemia, Hgb 10.    She reports a *** history of ***. Associated with ***. Worse with ***. She has tried *** with *** improvement.     Denies nausea, vomiting, heartburn, early satiety, and bloating. There has been no change in bowels. She has *** bowel movements per day. No rectal bleeding, hematochezia, or melena. No unintentional weight loss.     She does not take NSAIDS regularly.     No prior EGD or colonoscopy 2022 with inadequate prep. Repeat recommended.    Social history: -    Family history: Denies family history of colon cancer or other GI disorders or malignancy.     Past Medical History:   Diagnosis Date    Abnormal electrocardiogram (ECG) (EKG) 04/14/2022    Abnormal electrocardiogram [ECG] [EKG]    Candidal stomatitis 07/22/2019    Thrush    Cataract     Dry eyes     Glaucoma     Nicotine dependence, cigarettes, uncomplicated 07/11/2022    Cigarette nicotine dependence without complication    Personal history of other diseases of the nervous system and sense organs     History of carpal tunnel syndrome    Personal history of other diseases of the respiratory system     Personal history of asthma    Personal history of other endocrine, nutritional and metabolic disease 07/11/2022    History of diabetes mellitus    Type 2 diabetes mellitus with diabetic polyneuropathy 07/11/2022    Polyneuropathy in diabetes     No past surgical history on file.  Current Outpatient Medications   Medication Sig Dispense Refill    albuterol (Proventil HFA) 90 mcg/actuation inhaler Inhale 2 puffs every 4 hours if needed for wheezing or shortness of breath. 6.7 g 5    alcohol swabs (Pro Comfort Alcohol Pads) pads, medicated Apply 1 each topically once daily. 100 each 11    aspirin 81 mg EC tablet Take 1 tablet (81 mg) by mouth once daily. 30 tablet 11    atorvastatin  (Lipitor) 40 mg tablet Take 1 tablet (40 mg) by mouth once daily. 90 tablet 3    blood pressure test kit-medium kit 1 kit once daily. 1 kit 0    blood-glucose meter (Advocate Blood Glucose Monitor) misc 1 each 3 times a day. Please check blood sugars three time a day 1 each 0    diaper,brief,adult,disposable (Depend Underwear For Women Lrg) misc 1 each once daily. Use as needed for bowel and bladder incontinence 280 each 11    dorzolamide-timoloL (Cosopt) 22.3-6.8 mg/mL ophthalmic solution Administer 1 drop into both eyes 2 times a day. 15 mL 11    dulaglutide (Trulicity) 0.75 mg/0.5 mL pen injector Inject 3 mg under the skin 1 (one) time per week. 2 mL 11    empagliflozin (Jardiance) 10 mg Take 1 tablet (10 mg) by mouth once daily. 30 tablet 11    fluticasone propion-salmeteroL (Advair HFA) 45-21 mcg/actuation inhaler Inhale 2 puffs 2 times a day. Rinse mouth with water after use to reduce aftertaste and incidence of candidiasis. Do not swallow. 12 g 11    FreeStyle Avila 3 [place] misc Use as instructed 1 each 0    FreeStyle Avila 3 Sensor device Apply to skin 90 each 11    gabapentin (Neurontin) 800 mg tablet Take 1 tablet (800 mg) by mouth 3 times a day. 270 tablet 11    hydrocortisone 1 % ointment Apply topically 2 times a day. Apply to chest 56 g 3    ibuprofen 800 mg tablet Take 1 tablet (800 mg) by mouth 3 times a day as needed for mild pain (1 - 3) (pain). 180 tablet 11    insulin lispro (HumaLOG Tempo Pen) 100 unit/mL injection Inject 5 Units under the skin 3 times daily (morning, midday, late afternoon). 15 mL 11    lancets (Easy Comfort Lancets) 30 gauge misc TWICE DAILY 204 each 3    Lantus U-100 Insulin 100 unit/mL injection INJECT 75 UNITS UNDER THE SKIN ONCE DAILY AT BEDTIME. TAKE AS DIRECTED PER INSULIN INSTRUCTIONS 20 mL 3    lidocaine (Lidoderm) 5 % patch PLACE 1 PATCH OVER 12 HOURS ON THE SKIN ONCE DAILY. APPLY TO PAINFUL AREA 12 HOURS PER DAY, REMOVE FOR 12 HOURS. 30 patch 11    lidocaine  "(Xylocaine) 5 % ointment Apply 1 Application topically if needed for mild pain (1 - 3). 50 g 3    lisinopril 10 mg tablet Take 1 tablet (10 mg) by mouth once daily. 90 tablet 11    metFORMIN XR (Glucophage-XR) 500 mg 24 hr tablet Take 2 tablets (1,000 mg) by mouth once daily with breakfast. Do not crush, chew, or split. 800 tablet 0    miscellaneous medical supply misc 1 each once daily as needed (use during showers). Please use shower chair as needed 1 each 0    miscellaneous medical supply misc 1 each once daily as needed (use with ambulation). Please use with ambulation 1 each 0    miscellaneous medical supply misc 1 each once daily as needed (use at bedside). Use daily as needed at bedside 1 each 0    nicotine (Nicoderm CQ) 21 mg/24 hr patch Place 1 patch over 24 hours on the skin once every 24 hours. 30 patch 11    nicotine polacrilex (Nicorette) 4 mg gum Chew 1 each (4 mg) if needed for smoking cessation. 100 each 0    pen needle, diabetic 31 gauge x 5/16\" needle Use to inject 1-4 times daily as directed. 100 each 11    varenicline (Chantix) 1 mg tablet Take 1 tablet (1 mg) by mouth 2 times a day. Take with full glass of water. 60 tablet 2     Current Facility-Administered Medications   Medication Dose Route Frequency Provider Last Rate Last Admin    perflutren lipid microspheres (Definity) injection 0.5-10 mL of dilution  0.5-10 mL of dilution intravenous Once Elio Newman MD             Review of Systems  Review of Systems negative except as noted in HPI.    Objective     There were no vitals taken for this visit.     Physical Exam  Constitutional:  No acute distress. Normal appearance. Not ill-appearing.  HENT:  Head normocephalic and atraumatic. Conjunctivae normal.  Cardiovascular:  Normal rate. Regular rhythm.  Pulmonary:  Pulmonary effort normal. No respiratory distress. Breath sounds clear.  Abdominal:  Abdomen is flat and soft. There is no distension. No tenderness or guarding.  Skin: " Dry.  Neurological:  Alert and oriented.  Psychiatric:  Mood and affect normal.    Assessment/Plan     44 y.o. female with history of *** who presents today for clinic visit for *** history of ***.    Recommendations  1.  2. Follow up    Electronically signed by: Candace Sesay CNP on 2/3/2025 at 11:05 AM

## 2025-02-06 ENCOUNTER — APPOINTMENT (OUTPATIENT)
Dept: OPHTHALMOLOGY | Facility: CLINIC | Age: 45
End: 2025-02-06
Payer: MEDICAID

## 2025-02-11 ENCOUNTER — APPOINTMENT (OUTPATIENT)
Dept: GASTROENTEROLOGY | Facility: HOSPITAL | Age: 45
End: 2025-02-11
Payer: COMMERCIAL

## 2025-02-13 ENCOUNTER — APPOINTMENT (OUTPATIENT)
Dept: OPHTHALMOLOGY | Facility: CLINIC | Age: 45
End: 2025-02-13
Payer: MEDICAID

## 2025-02-14 ENCOUNTER — APPOINTMENT (OUTPATIENT)
Dept: PRIMARY CARE | Facility: CLINIC | Age: 45
End: 2025-02-14
Payer: COMMERCIAL

## 2025-02-14 ENCOUNTER — CLINICAL SUPPORT (OUTPATIENT)
Facility: HOSPITAL | Age: 45
End: 2025-02-14
Payer: COMMERCIAL

## 2025-02-14 NOTE — PROGRESS NOTES
[employer]  Diet Recommendation 1: Healthy Eating  Food Intolerance Avoidance: Mushrooms, coconut  Household Size: 1 Family Member  Interventions: Referral Number: 4th 6 Mo Referral 2 yrs (Referrals may not be consecutive)  Grains: 0-25% Whole  Fruit: 50-75% Fresh  Vegetables: 50-75% Fresh  Proteins: 0 Plant-based Items  Dairy: 25-50% Lowfat  Originating Site of Referral Order: [facility]  Initials of RD Assisting Today: KEVIN

## 2025-02-27 ENCOUNTER — APPOINTMENT (OUTPATIENT)
Dept: OPHTHALMOLOGY | Facility: CLINIC | Age: 45
End: 2025-02-27
Payer: COMMERCIAL

## 2025-03-04 ENCOUNTER — APPOINTMENT (OUTPATIENT)
Dept: OPHTHALMOLOGY | Facility: CLINIC | Age: 45
End: 2025-03-04
Payer: COMMERCIAL

## 2025-03-14 ENCOUNTER — TELEPHONE (OUTPATIENT)
Facility: HOSPITAL | Age: 45
End: 2025-03-14

## 2025-03-14 ENCOUNTER — DOCUMENTATION (OUTPATIENT)
Dept: HOME HEALTH SERVICES | Facility: HOME HEALTH | Age: 45
End: 2025-03-14
Payer: COMMERCIAL

## 2025-03-14 ENCOUNTER — TELEPHONE (OUTPATIENT)
Dept: HOME HEALTH SERVICES | Facility: HOME HEALTH | Age: 45
End: 2025-03-14
Payer: COMMERCIAL

## 2025-03-14 ENCOUNTER — OFFICE VISIT (OUTPATIENT)
Facility: HOSPITAL | Age: 45
End: 2025-03-14
Payer: COMMERCIAL

## 2025-03-14 VITALS
SYSTOLIC BLOOD PRESSURE: 104 MMHG | HEART RATE: 106 BPM | HEIGHT: 67 IN | OXYGEN SATURATION: 100 % | BODY MASS INDEX: 27.72 KG/M2 | TEMPERATURE: 97.1 F | WEIGHT: 176.6 LBS | DIASTOLIC BLOOD PRESSURE: 64 MMHG

## 2025-03-14 DIAGNOSIS — R41.82 ALTERED MENTAL STATUS, UNSPECIFIED ALTERED MENTAL STATUS TYPE: ICD-10-CM

## 2025-03-14 DIAGNOSIS — S39.92XD INJURY OF BACK, SUBSEQUENT ENCOUNTER: ICD-10-CM

## 2025-03-14 DIAGNOSIS — E11.40 DIABETIC NEUROPATHY, PAINFUL (MULTI): ICD-10-CM

## 2025-03-14 DIAGNOSIS — K59.09 OTHER CONSTIPATION: ICD-10-CM

## 2025-03-14 DIAGNOSIS — Z12.11 COLON CANCER SCREENING: ICD-10-CM

## 2025-03-14 DIAGNOSIS — E11.9 INSULIN DEPENDENT TYPE 2 DIABETES MELLITUS (MULTI): ICD-10-CM

## 2025-03-14 DIAGNOSIS — R32 URINARY INCONTINENCE, UNSPECIFIED TYPE: ICD-10-CM

## 2025-03-14 DIAGNOSIS — E78.5 HYPERLIPIDEMIA, UNSPECIFIED HYPERLIPIDEMIA TYPE: ICD-10-CM

## 2025-03-14 DIAGNOSIS — J41.0 SIMPLE CHRONIC BRONCHITIS (MULTI): ICD-10-CM

## 2025-03-14 DIAGNOSIS — M79.606 PAIN OF LOWER EXTREMITY, UNSPECIFIED LATERALITY: ICD-10-CM

## 2025-03-14 DIAGNOSIS — R13.10 DYSPHAGIA, UNSPECIFIED TYPE: ICD-10-CM

## 2025-03-14 DIAGNOSIS — E11.69 TYPE 2 DIABETES MELLITUS WITH OTHER SPECIFIED COMPLICATION, WITH LONG-TERM CURRENT USE OF INSULIN: Primary | ICD-10-CM

## 2025-03-14 DIAGNOSIS — Z79.4 INSULIN DEPENDENT TYPE 2 DIABETES MELLITUS (MULTI): ICD-10-CM

## 2025-03-14 DIAGNOSIS — R55 SYNCOPE, UNSPECIFIED SYNCOPE TYPE: ICD-10-CM

## 2025-03-14 DIAGNOSIS — Z79.4 TYPE 2 DIABETES MELLITUS WITH OTHER SPECIFIED COMPLICATION, WITH LONG-TERM CURRENT USE OF INSULIN: Primary | ICD-10-CM

## 2025-03-14 RX ORDER — ISOPROPYL ALCOHOL 70 ML/100ML
1 SWAB TOPICAL DAILY
Qty: 100 EACH | Refills: 11 | Status: SHIPPED | OUTPATIENT
Start: 2025-03-14

## 2025-03-14 RX ORDER — GABAPENTIN 800 MG/1
800 TABLET ORAL 3 TIMES DAILY
Qty: 270 TABLET | Refills: 11 | Status: SHIPPED | OUTPATIENT
Start: 2025-03-14

## 2025-03-14 RX ORDER — INSULIN GLARGINE 100 [IU]/ML
75 INJECTION, SOLUTION SUBCUTANEOUS NIGHTLY
Qty: 20 ML | Refills: 3 | Status: SHIPPED | OUTPATIENT
Start: 2025-03-14

## 2025-03-14 RX ORDER — ASPIRIN 81 MG/1
81 TABLET ORAL DAILY
Qty: 30 TABLET | Refills: 11 | Status: SHIPPED | OUTPATIENT
Start: 2025-03-14 | End: 2026-03-14

## 2025-03-14 RX ORDER — BLOOD-GLUCOSE CONTROL, NORMAL
EACH MISCELLANEOUS
Qty: 204 EACH | Refills: 3 | Status: SHIPPED | OUTPATIENT
Start: 2025-03-14

## 2025-03-14 RX ORDER — ALBUTEROL SULFATE 90 UG/1
2 INHALANT RESPIRATORY (INHALATION) EVERY 4 HOURS PRN
Qty: 6.7 G | Refills: 5 | Status: SHIPPED | OUTPATIENT
Start: 2025-03-14 | End: 2026-03-14

## 2025-03-14 RX ORDER — DOCUSATE SODIUM 100 MG/1
100 CAPSULE, LIQUID FILLED ORAL 2 TIMES DAILY
Qty: 60 CAPSULE | Refills: 11 | Status: SHIPPED | OUTPATIENT
Start: 2025-03-14

## 2025-03-14 RX ORDER — FLUTICASONE PROPIONATE AND SALMETEROL XINAFOATE 45; 21 UG/1; UG/1
2 AEROSOL, METERED RESPIRATORY (INHALATION)
Qty: 12 G | Refills: 11 | Status: SHIPPED | OUTPATIENT
Start: 2025-03-14 | End: 2026-03-14

## 2025-03-14 RX ORDER — ATORVASTATIN CALCIUM 40 MG/1
40 TABLET, FILM COATED ORAL DAILY
Qty: 90 TABLET | Refills: 3 | Status: SHIPPED | OUTPATIENT
Start: 2025-03-14

## 2025-03-14 RX ORDER — IBUPROFEN 800 MG/1
800 TABLET ORAL 3 TIMES DAILY PRN
Qty: 180 TABLET | Refills: 11 | Status: SHIPPED | OUTPATIENT
Start: 2025-03-14 | End: 2026-03-14

## 2025-03-14 RX ORDER — LIDOCAINE 50 MG/G
OINTMENT TOPICAL AS NEEDED
Qty: 283.52 G | Refills: 11 | Status: SHIPPED | OUTPATIENT
Start: 2025-03-14 | End: 2026-03-14

## 2025-03-14 RX ORDER — METFORMIN HYDROCHLORIDE 500 MG/1
1000 TABLET, EXTENDED RELEASE ORAL
Qty: 800 TABLET | Refills: 0 | Status: SHIPPED | OUTPATIENT
Start: 2025-03-14 | End: 2026-04-18

## 2025-03-14 ASSESSMENT — PAIN SCALES - GENERAL: PAINLEVEL_OUTOF10: 6

## 2025-03-14 NOTE — PROGRESS NOTES
"Subjective   Patient ID: Patricia Vieyra is a 44 y.o. female who presents for Follow-up and Med Refill.    HPI   44 yo F w/ PMHx of IDDM, severe peripheral neuropathy 2/2 IDDM, and HTN who presents to the clinic with recent fall    # recent fall   # syncope   - in bathroom set cup on the counter and fell on her left side with LOC.   - denies any head strike or other trauma with fall.   - son heard her fall and stated that she had LOC for about 2 minutes. The son had sprayed water on her face which made her regain consciousness.   - no aura prior to fall.   - denies headaches   - pt states that she has had 2-4 falls per week.   - She said that she is now alone after 12 PM as her daughter goes to work and was inquiring about a home health aid.   - urinary incontinence at baseline     # dysphagia   - persistent dysphagia to solids and liquids   - pt feels sensation of \"lump\" in her throat that makes it difficult for her to swallow.   - denies pain associated with swallowing   - states that she does regurgitate some of her food.     Objective   BP 91/56 (BP Location: Right arm, Patient Position: Sitting)   Pulse 106   Temp 36.2 °C (97.1 °F) (Temporal)   Ht 1.702 m (5' 7\")   Wt 80.1 kg (176 lb 9.6 oz)   SpO2 100%   BMI 27.66 kg/m²     Physical Exam  Gen: NAD, nontox, using rollator at baseline  HEENT: NCAT. MMM. No LAD, no tenderness to palpation of neck, thyroid not palpable.   RESP: no resp distress, talks in full sentences, CTABL  CVS: non-tachycardic, RRR  ABD: Soft, non-distended  Psych: appropriately answers questions. normal mood and affect  MSK: atalgic gait,     Assessment/Plan   44 yo F w/ PMHx of IDDM, severe peripheral neuropathy 2/2 IDDM, and HTN  who presents to the clinic to request for a home health aid as she states that she has had frequent falls and her daughter can not be home with her to ensure her safety. EKG obtained in office was unremarkable. ECHO obtained on 11/2024 with normal LV with no " abnormal findings on ECHO. Ordered a carotid doppler for syncopal workup. If negative will refer pt to cardiology and neurology for dizziness workup. Pt has also had persistent dysphagia for which she has been referred to ENT.     Problem List Items Addressed This Visit       Injury of back    Relevant Medications    ibuprofen 800 mg tablet    Constipation    Relevant Medications    docusate sodium (Colace) 100 mg capsule    Other Relevant Orders    Referral to Nutrition Services    Dysphagia  Persistent dysphagia to solids and liquids   Chronic   NBNB post-prandial emesis with early satiety.     Relevant Orders    Referral to ENT    Hyperlipidemia    Relevant Medications    atorvastatin (Lipitor) 40 mg tablet    Incontinence    Relevant Medications    incontinence pad, liner, disp pad     Other Visit Diagnoses       Type 2 diabetes mellitus with other specified complication, with long-term current use of insulin    -  Primary  Lab Results   Component Value Date    HGBA1C 9.1 (A) 08/30/2024   - c/w current diabetic medications - metformin 500 mg every day, lantus 75 U at bedtime , jardiance 10 mg, Trulicity 3 mg/ week   - Obtain HbA1c     Relevant Medications    metFORMIN XR (Glucophage-XR) 500 mg 24 hr tablet    insulin glargine (Lantus U-100 Insulin) 100 unit/mL injection    lancets (Easy Comfort Lancets) 30 gauge misc    empagliflozin (Jardiance) 10 mg    dulaglutide (Trulicity) 3 mg/0.5 mL injection    alcohol swabs (Pro Comfort Alcohol Pads) pads, medicated    Other Relevant Orders    Hemoglobin A1C    Referral to Home Care    Syncope, unspecified syncope type      :: no aura or post-ictal state  :: ECHO ( 11/2024) with LVEF of 60-65% . There is normal right ventricular global systolic function.  3. Right ventricular systolic pressure is within normal limits.  4. Normal aortic root.   - IO EKG obtained and unremarkable   - ordered carotid doppler.     Relevant Orders    ECG 12 lead (Clinic Performed)    Colon  cancer screening        Relevant Orders    Colonoscopy Screening; Average Risk Patient    Insulin dependent type 2 diabetes mellitus (Multi)        Relevant Medications    insulin lispro (HumaLOG Tempo Pen) 100 unit/mL injection    Simple chronic bronchitis (Multi)        Relevant Medications    albuterol (Proventil HFA) 90 mcg/actuation inhaler    fluticasone propion-salmeteroL (Advair HFA) 45-21 mcg/actuation inhaler    Altered mental status, unspecified altered mental status type        Relevant Medications    aspirin 81 mg EC tablet    Diabetic neuropathy, painful (Multi)        Relevant Medications    gabapentin (Neurontin) 800 mg tablet    Pain of lower extremity, unspecified laterality        Relevant Medications    lidocaine (Xylocaine) 5 % ointment     RTC in 3 months for HM     Discussed with Dr. Ji Parra MD, MPH   Family Medicine and Preventive Health, PGY-3

## 2025-03-14 NOTE — TELEPHONE ENCOUNTER
Patient was recently seen by Dr Parra after her visit she was asked to check out at the discharge window. During her check out she started being loud about her medication. Mohamud did send over a message to the provider but the patient then started using profanity and tell us that she can talk how she wants to talk. We did ask for security to come but the patient being to leave the clinic.

## 2025-03-14 NOTE — HH CARE COORDINATION
This referral has been made a Non Admit with  Home Care due to No Skilled Disciplines Ordered. If you have further questions, feel free to reach out to our office at [phone]. Thank you, [facility] Intake.    Provider Notification:  Sent Telephone Encounter sent to referrin provider informing that [facility] has not accepted patient referral.

## 2025-03-14 NOTE — PATIENT INSTRUCTIONS
Please make an appointment to get colonoscopy, mammogram, and carotid ultrasound.    Scheduling Phone Numbers:  - Specialty Provider: [phone]  - Heart/Vascular Specialist: [phone]  - [phone]  - PT/OT: [phone]  - Colonoscopy: [phone]     I also put in a referral for ENT. You can call the specialty provider phone number to make the appointmetn.

## 2025-03-14 NOTE — TELEPHONE ENCOUNTER
Pt was leaving the office today, stopped at discharge window and became upset over Dr. Parra not putting in her medications correctly. She proceeded to start yelling and cussing at Mohamud and Aza. I don't think this behavior is appropriate, and she should be called to sort things out.

## 2025-03-14 NOTE — TELEPHONE ENCOUNTER
Delvis Newman,   [facility] received a home care referral for a home health aide. This referral has been made a Non Admit with  Home Care due to No Skilled Disciplines Ordered. If there was a skilled service needed, patient may be more appropriate with agency that would have staff with behavioral Health training such as [facility] or [facility]. As referral stands.. [facility] cannot open a case with only a HHA. If you have further questions, feel free to reach out to our office at [phone]. Thank you, [facility] Intake.    Thank you,  Alexandra Goode  [facility] Intake Department

## 2025-03-20 ENCOUNTER — PATIENT OUTREACH (OUTPATIENT)
Dept: CARE COORDINATION | Facility: CLINIC | Age: 45
End: 2025-03-20
Payer: COMMERCIAL

## 2025-03-20 ENCOUNTER — PATIENT MESSAGE (OUTPATIENT)
Dept: CARE COORDINATION | Facility: CLINIC | Age: 45
End: 2025-03-20
Payer: COMMERCIAL

## 2025-03-20 NOTE — PROGRESS NOTES
This RD sent pt educational materials to pt on nutrition for constipation per nutrition referral. Pt has this RD's information if she wishes to make an appointment.

## 2025-04-01 DIAGNOSIS — Z12.11 COLON CANCER SCREENING: Primary | ICD-10-CM

## 2025-04-01 RX ORDER — POLYETHYLENE GLYCOL 3350, SODIUM CHLORIDE, SODIUM BICARBONATE, POTASSIUM CHLORIDE 420; 11.2; 5.72; 1.48 G/4L; G/4L; G/4L; G/4L
8000 POWDER, FOR SOLUTION ORAL ONCE
Qty: 8000 ML | Refills: 0 | Status: SHIPPED | OUTPATIENT
Start: 2025-04-01 | End: 2025-04-05

## 2025-04-11 ENCOUNTER — APPOINTMENT (OUTPATIENT)
Dept: ENDOCRINOLOGY | Facility: CLINIC | Age: 45
End: 2025-04-11
Payer: COMMERCIAL

## 2025-04-11 VITALS
HEART RATE: 101 BPM | WEIGHT: 176 LBS | HEIGHT: 67 IN | BODY MASS INDEX: 27.62 KG/M2 | DIASTOLIC BLOOD PRESSURE: 66 MMHG | SYSTOLIC BLOOD PRESSURE: 93 MMHG

## 2025-04-11 DIAGNOSIS — Z79.4 TYPE 2 DIABETES MELLITUS WITH OTHER SPECIFIED COMPLICATION, WITH LONG-TERM CURRENT USE OF INSULIN: ICD-10-CM

## 2025-04-11 DIAGNOSIS — E11.9 TYPE 2 DIABETES MELLITUS WITHOUT COMPLICATION, UNSPECIFIED WHETHER LONG TERM INSULIN USE: ICD-10-CM

## 2025-04-11 DIAGNOSIS — E11.9 INSULIN DEPENDENT TYPE 2 DIABETES MELLITUS (MULTI): ICD-10-CM

## 2025-04-11 DIAGNOSIS — E11.69 TYPE 2 DIABETES MELLITUS WITH OTHER SPECIFIED COMPLICATION, WITH LONG-TERM CURRENT USE OF INSULIN: ICD-10-CM

## 2025-04-11 DIAGNOSIS — Z79.4 INSULIN DEPENDENT TYPE 2 DIABETES MELLITUS (MULTI): ICD-10-CM

## 2025-04-11 LAB — POC HEMOGLOBIN A1C: 13.3 % (ref 4.2–6.5)

## 2025-04-11 PROCEDURE — 3074F SYST BP LT 130 MM HG: CPT | Performed by: STUDENT IN AN ORGANIZED HEALTH CARE EDUCATION/TRAINING PROGRAM

## 2025-04-11 PROCEDURE — 4010F ACE/ARB THERAPY RXD/TAKEN: CPT | Performed by: STUDENT IN AN ORGANIZED HEALTH CARE EDUCATION/TRAINING PROGRAM

## 2025-04-11 PROCEDURE — 99204 OFFICE O/P NEW MOD 45 MIN: CPT | Performed by: STUDENT IN AN ORGANIZED HEALTH CARE EDUCATION/TRAINING PROGRAM

## 2025-04-11 PROCEDURE — 3046F HEMOGLOBIN A1C LEVEL >9.0%: CPT | Performed by: STUDENT IN AN ORGANIZED HEALTH CARE EDUCATION/TRAINING PROGRAM

## 2025-04-11 PROCEDURE — 3008F BODY MASS INDEX DOCD: CPT | Performed by: STUDENT IN AN ORGANIZED HEALTH CARE EDUCATION/TRAINING PROGRAM

## 2025-04-11 PROCEDURE — 83036 HEMOGLOBIN GLYCOSYLATED A1C: CPT | Performed by: STUDENT IN AN ORGANIZED HEALTH CARE EDUCATION/TRAINING PROGRAM

## 2025-04-11 PROCEDURE — 3078F DIAST BP <80 MM HG: CPT | Performed by: STUDENT IN AN ORGANIZED HEALTH CARE EDUCATION/TRAINING PROGRAM

## 2025-04-11 RX ORDER — INSULIN GLARGINE 100 [IU]/ML
55 INJECTION, SOLUTION SUBCUTANEOUS NIGHTLY
Qty: 20 ML | Refills: 11 | Status: SHIPPED | OUTPATIENT
Start: 2025-04-11

## 2025-04-11 RX ORDER — LURASIDONE HYDROCHLORIDE 120 MG/1
120 TABLET, FILM COATED ORAL
COMMUNITY
Start: 2025-03-29

## 2025-04-11 RX ORDER — BLOOD-GLUCOSE METER
EACH MISCELLANEOUS
Qty: 300 EACH | Refills: 3 | Status: SHIPPED | OUTPATIENT
Start: 2025-04-11

## 2025-04-11 RX ORDER — DULOXETIN HYDROCHLORIDE 60 MG/1
1 CAPSULE, DELAYED RELEASE ORAL
COMMUNITY
Start: 2025-03-29

## 2025-04-11 RX ORDER — CHOLECALCIFEROL (VITAMIN D3) 125 MCG
1 CAPSULE ORAL DAILY
COMMUNITY
Start: 2025-03-29

## 2025-04-11 RX ORDER — LAMOTRIGINE 200 MG/1
1 TABLET ORAL
COMMUNITY
Start: 2025-03-29

## 2025-04-11 RX ORDER — MELATONIN 10 MG
10 CAPSULE ORAL NIGHTLY PRN
COMMUNITY
Start: 2025-03-29

## 2025-04-11 RX ORDER — BLOOD-GLUCOSE METER
EACH MISCELLANEOUS
COMMUNITY
Start: 2025-03-29 | End: 2025-04-11 | Stop reason: SDUPTHER

## 2025-04-11 RX ORDER — HYDROXYZINE PAMOATE 25 MG/1
25 CAPSULE ORAL 2 TIMES DAILY PRN
COMMUNITY
Start: 2025-03-29

## 2025-04-11 RX ORDER — PRAZOSIN HYDROCHLORIDE 5 MG/1
5 CAPSULE ORAL NIGHTLY
COMMUNITY
Start: 2025-03-29

## 2025-04-11 ASSESSMENT — PAIN SCALES - GENERAL: PAINLEVEL_OUTOF10: 7

## 2025-04-11 NOTE — Clinical Note
Hi, can we please make this patient a follow up at [place] for 3 months from now while I am out? Thank yoU!

## 2025-04-11 NOTE — PATIENT INSTRUCTIONS
Put on sensor when you get home   2.   Decrease Lantus from 75 units to 55 units once daily   3.   Start taking your meal time Humalog of 15 units with each meal   4.   Please cut out juices - this will make a big difference in your blood sugar   5.  Continue Trulicity 3 mg once weekly once you get the increased dose   6.  Increase metformin from 1 pill twice daily to 2 pills twice daily   7.  Get labs done   8.  Follow up with Luz Husain, diabetes educator in 2 weeks - PLEASE BRING ALL YOUR SUPPLIES AND INSULIN WITH YOU

## 2025-04-11 NOTE — PROGRESS NOTES
Endocrinology  4/11/2025    History of Present Illness   Patricia Vieyra is a 44 y.o. year old female with medical history of T2D, bipolar disorder, HTN, HLD, here for diabetes.     Diagnosed with diabetes at age 18.   She reports at time of diagnosis she had a syncopal event and was subsequently diagnosed with diabetes.     Current regimen:   - Metformin 500 mg twice daily (Rx for 1000 mg twice daily)   - Lantus 75 units daily   - Jardiance 10 mg once daily   - Humalog 15 units with each meal - not used since 2/2025  - Trulicity 1.5 mg once weekly - Rx for 3 mg once weekly, but reports she has not started this yet     SMBG: Libre3 with will. Has not worn since February. When not wearing, she does not check BG.   Has not been using meal time insulin since February.   She stopped wearing it because the alarms bother her overnight.          Hypoglycemia: Denies    Hypoglycemia awareness: Yes    Weight changes: Lost 6 lbs.    +increased thirst, urination, and blurry vision.     Diet:   - 1 main meal a day   - Snacks throughout the day   - Lots of tuna fish, hamburger helper - she doesn't cook. Daughter cooks for her and cooks daily.   - Drinks apple and orange juice daily - 32 oz daily   - Drinks water throughout the night     She states that if she doesn't drink this much juice, her blood sugar will drop into the 50s/60s     DM history:   Year/age at diagnosis: 18 years old    Prior medications:   - Glipizide   Previous hospitalizations for hyperglycemia: Yes - reports 6 times, but denies ever needing insulin through IV   Complications: Neuropathy       Medications     Current Outpatient Medications   Medication Instructions    albuterol (Proventil HFA) 90 mcg/actuation inhaler 2 puffs, inhalation, Every 4 hours PRN    alcohol swabs (Pro Comfort Alcohol Pads) pads, medicated 1 each, Topical, Daily    aspirin 81 mg, oral, Daily    atorvastatin (LIPITOR) 40 mg, oral, Daily    blood pressure test kit-medium kit 1 kit,  "miscellaneous, Daily    blood-glucose meter (Advocate Blood Glucose Monitor) misc 1 each, miscellaneous, 3 times daily, Please check blood sugars three time a day    diaper,brief,adult,disposable (Depend Underwear For Women Lrg) misc 1 each, miscellaneous, Daily, Use as needed for bowel and bladder incontinence    docusate sodium (COLACE) 100 mg, oral, 2 times daily    dorzolamide-timoloL (Cosopt) 22.3-6.8 mg/mL ophthalmic solution 1 drop, Both Eyes, 2 times daily    dulaglutide (TRULICITY) 3 mg, subcutaneous, Weekly    DULoxetine (Cymbalta) 60 mg DR capsule 1 capsule, Every 12 hours scheduled (0630,1830)    Easy Comfort Pen Needles 31 gauge x 3/16\" needle     empagliflozin (JARDIANCE) 10 mg, oral, Daily    fluticasone propion-salmeteroL (Advair HFA) 45-21 mcg/actuation inhaler 2 puffs, inhalation, 2 times daily RT, Rinse mouth with water after use to reduce aftertaste and incidence of candidiasis. Do not swallow.    FreeStyle Avila 3 [place] misc Use as instructed    FreeStyle Avila 3 Sensor device Apply to skin    gabapentin (NEURONTIN) 800 mg, oral, 3 times daily    hydrocortisone 1 % ointment Topical, 2 times daily, Apply to chest    hydrOXYzine pamoate (VISTARIL) 25 mg, 2 times daily PRN    ibuprofen 800 mg, oral, 3 times daily PRN    incontinence pad, liner, disp pad 1 Units, miscellaneous, As needed    insulin lispro (HUMALOG TEMPO PEN) 5 Units, subcutaneous, 3 times daily (morning, midday, late afternoon)    lamoTRIgine (LaMICtal) 200 mg tablet 1 tablet, Daily (0630)    lancets (Easy Comfort Lancets) 30 gauge [facility] TWICE DAILY    Lantus U-100 Insulin 75 Units, subcutaneous, Nightly, Take as directed per insulin instructions.    lidocaine (Lidoderm) 5 % patch 1 patch, transdermal, Daily, Apply to painful area 12 hours per day, remove for 12 hours.    lidocaine (Xylocaine) 5 % ointment 1 Application, Topical, As needed    lidocaine (Xylocaine) 5 % ointment Topical, As needed    lisinopril 10 mg, oral, Daily    " "lurasidone (LATUDA) 120 mg, Daily with breakfast    melatonin 10 mg, Nightly PRN    metFORMIN XR (GLUCOPHAGE-XR) 1,000 mg, oral, Daily with breakfast, Do not crush, chew, or split.    miscellaneous medical supply misc 1 each, miscellaneous, Daily PRN, Please use shower chair as needed    miscellaneous medical supply misc 1 each, miscellaneous, Daily PRN, Please use with ambulation    miscellaneous medical supply misc 1 each, miscellaneous, Daily PRN, Use daily as needed at bedside    nicotine (Nicoderm CQ) 21 mg/24 hr patch 1 patch, transdermal, Every 24 hours    nicotine polacrilex (NICORETTE) 4 mg, Mouth/Throat, As needed    pen needle, diabetic 31 gauge x 5/16\" needle Use to inject 1-4 times daily as directed.    prazosin (MINIPRESS) 5 mg, Nightly    Probiotic Acidophilus 250 million cell capsule 1 capsule, Daily    Trulicity 3 mg, subcutaneous, Once Weekly    varenicline tartrate (CHANTIX) 1 mg, oral, 2 times daily, Take with full glass of water.        History     Past Medical History:   Diagnosis Date    Abnormal electrocardiogram (ECG) (EKG) 04/14/2022    Abnormal electrocardiogram [ECG] [EKG]    Candidal stomatitis 07/22/2019    Thrush    Cataract     Dry eyes     Glaucoma     Nicotine dependence, cigarettes, uncomplicated 07/11/2022    Cigarette nicotine dependence without complication    Personal history of other diseases of the nervous system and sense organs     History of carpal tunnel syndrome    Personal history of other diseases of the respiratory system     Personal history of asthma    Personal history of other endocrine, nutritional and metabolic disease 07/11/2022    History of diabetes mellitus    Type 2 diabetes mellitus with diabetic polyneuropathy 07/11/2022    Polyneuropathy in diabetes       No past surgical history on file.    Family History   Problem Relation Name Age of Onset    Glaucoma Mother      Glaucoma Maternal Grandmother          Allergies   Allergen Reactions    Bee Venom " "Protein (Honey Bee) Anaphylaxis    Bee Sting Kit Swelling    Bupropion Unknown     Suicidal ideation    Coconut Hives    Mushroom Hives and Rash        Social history: Lives alone, but daughter cooks for her daily      Physical Exam   BP 93/66 (BP Location: Right arm, Patient Position: Sitting, BP Cuff Size: Adult)   Pulse 101   Ht 1.702 m (5' 7\")   Wt 79.8 kg (176 lb)   BMI 27.57 kg/m²   General: Well appearing, no acute distress  Heart: Normal rate  Neck: No visible goiter   Lungs: Breathing comfortably on room air   Skin: No rashes    Labs and Imaging     Lab Results   Component Value Date    HGBA1C 9.1 (A) 08/30/2024    HGBA1C 12 (A) 07/09/2024    HGBA1C 11.5 (H) 03/05/2024     (L) 10/30/2024    K 4.1 10/30/2024     10/30/2024    CO2 26 10/30/2024    BUN 8 10/30/2024    CREATININE 1.02 10/30/2024    CALCIUM 9.2 10/30/2024    ALBUMIN 4.3 10/30/2024    PROT 6.9 10/30/2024    BILITOT 0.4 10/30/2024    ALKPHOS 77 10/30/2024    ALT 10 10/30/2024    AST 11 10/30/2024    GLUCOSE 231 (H) 10/30/2024    CHOL 108 11/18/2022    TRIG 74 11/18/2022    HDL 64.0 11/18/2022    BHYDRXBUT 0.07 04/05/2018       Assessment and Plan   Patricia Vieyra is a 44 y.o. year old female with medical history of T2D, bipolar disorder, HTN, HLD, here for diabetes. BG control is not at goal (POC A1c 13.3%). She has not worn CGM/checked BG since 2/2025 due to alarm fatigue, and has subsequently not taken any meal time insulin since then. She reports that if she does not drink juice throughout the day (drinking 32 oz/day), she will develop hypoglycemia, likely indicating her basal insulin is too much.    We turned off her high alarms for CGM, and she is agreeable to wear it with this change. Discussed cutting out juice as well. Besides decreasing basal insulin due to report hypoglycemia, cannot safely make insulin changes today without any BG data.     # T2D:   - Decrease lantus from 75 units to 55 units once daily   - Restart " lispro 15 units AC   - Restart CGM   - Continue Trulicity to 3 mg as planned   - Increase metformin to 1000 mg twice daily   - Continue Jardiance 10 mg daily   - Discussed cutting out juice   - Lipids: Due   - Urine Alb/Cr: Due  - Given age at diagnosis, will also check antibodies and c-peptide to rule out T1D, but I think it is unlikely     RTC:   2 week CGM review with diabetes education   To be seen at [facility] while I am out in 3-4 months      Danni Akhtar, DO   Endocrinology

## 2025-04-12 ENCOUNTER — TELEPHONE (OUTPATIENT)
Dept: ENDOCRINOLOGY | Facility: HOSPITAL | Age: 45
End: 2025-04-12
Payer: COMMERCIAL

## 2025-04-12 LAB
ALBUMIN SERPL-MCNC: 4.4 G/DL (ref 3.6–5.1)
ALBUMIN/CREAT UR: 241 MG/G CREAT
ALP SERPL-CCNC: 103 U/L (ref 31–125)
ALT SERPL-CCNC: 18 U/L (ref 6–29)
ANION GAP SERPL CALCULATED.4IONS-SCNC: 11 MMOL/L (CALC) (ref 7–17)
AST SERPL-CCNC: 13 U/L (ref 10–35)
BILIRUB SERPL-MCNC: 0.3 MG/DL (ref 0.2–1.2)
BUN SERPL-MCNC: 14 MG/DL (ref 7–25)
C PEPTIDE SERPL-MCNC: 1.19 NG/ML (ref 0.8–3.85)
CALCIUM SERPL-MCNC: 9.5 MG/DL (ref 8.6–10.2)
CHLORIDE SERPL-SCNC: 96 MMOL/L (ref 98–110)
CO2 SERPL-SCNC: 25 MMOL/L (ref 20–32)
CREAT SERPL-MCNC: 1.14 MG/DL (ref 0.5–0.99)
CREAT UR-MCNC: 76 MG/DL (ref 20–275)
EGFRCR SERPLBLD CKD-EPI 2021: 60 ML/MIN/1.73M2
GAD65 AB SER IA-ACNC: NORMAL
GLUCOSE SERPL-MCNC: 517 MG/DL (ref 65–99)
ISLET CELL512 AB SER IA-ACNC: NORMAL
MICROALBUMIN UR-MCNC: 18.3 MG/DL
POTASSIUM SERPL-SCNC: 4.7 MMOL/L (ref 3.5–5.3)
PROT SERPL-MCNC: 7 G/DL (ref 6.1–8.1)
SODIUM SERPL-SCNC: 132 MMOL/L (ref 135–146)
ZNT8 AB SERPL IA-ACNC: NORMAL

## 2025-04-12 NOTE — TELEPHONE ENCOUNTER
Got a page regarding critical lab result from [facility] lab.  Patient was seen by Dr. Akhtar in clinic on/11/2025 and blood work was ordered.  Of note, patient is a 44-year-old with past medical history of poorly controlled type 2 diabetes with poor medication adherence, HTN, HLD.    Critical result of blood glucose 517 with bicarb 25, anion gap 11 noted.  A1c : 13.3    Called patient in response to the page.  Patient reported that she is feeling well.  She denied any nausea, vomiting, headache, dizziness, excessive urination or thirst.  Patient denied any increase in frequency of urination/thirst from baseline.  She denied feeling sick or being under the weather.    Patient was informed about critical blood glucose value of 517 on blood work result.  She reported that she has a CGM on and last blood glucose on CGM was 350 around 9 PM on 4/11/2025.  Patient was asked to recheck her blood glucose on CGM and it was 400.      She was advised to check her blood glucose stat using glucometer.  However, patient said that she does not has a glucometer at home or with her (as she was outside during telephone call).    Patient also reported that she has taken her insulin Lantus 50 units in the morning and insulin lispro 15 units with breakfast this morning without any problem.      CGM data reviewed as follows:                Patient advised to check her blood glucose with glucometer as soon as possible.  She was advised to continue using CGM to guide her about blood glucose trend.  Patient was educated about signs and symptoms of acidosis/high blood glucose like nausea, vomiting, abdominal pain, excessive thirst or urination, feeling sick.  She was advised to come to emergency department in case she feels sick and her blood glucose continue to rise.  However, patient assured that she is feeling okay and has no symptoms at all.  She agreed to take her insulin as recommended and to monitor blood glucose on CGM.      Pt  understood and gave appropriate teach back about the plan of care. All questions  were answered to the patient's satisfaction. The patient is instructed to contact us at any time if questions or problems arise.     Kate Andrews MD  Endocrinology fellow

## 2025-04-13 ENCOUNTER — DOCUMENTATION (OUTPATIENT)
Dept: CARE COORDINATION | Age: 45
End: 2025-04-13
Payer: COMMERCIAL

## 2025-04-13 NOTE — PROGRESS NOTES
DATE OF CALL: 04.13.2025    TIME OF CALL: 0941    RESULT CALLED BY: Rell    RESULT CALLED: Glucose 517    NOTIFIED: Dr. Danni Akhtar  DATE NOTIFICATION: 04.13.2025  TIME NOTIFICATION: 0944    R/B VERIFIED: YES    DID RESULT HAVE TO BE ESCALATED? N  TO WHOM?

## 2025-04-14 ENCOUNTER — TELEPHONE (OUTPATIENT)
Dept: ENDOCRINOLOGY | Facility: CLINIC | Age: 45
End: 2025-04-14
Payer: COMMERCIAL

## 2025-04-14 NOTE — TELEPHONE ENCOUNTER
"Spoke to patient about persistent hyperglycemia. (CMP from 4/11 shows BG > 500, but no anion gap).   Avila is reading \"400\"    Today she reports feeling unwell - headache and lightheadedness. Denies any abdominal pain, nausea, vomiting.   I recommended ED evaluation.   She is right now refusing ED and would like to try to bring BG down at home. I instructed her to stop eating until BG is 200-300 again, hydrate well with water, and use her fast acting insulin according to her sliding scale every 4-5 hours.   I reiterated given her symptoms she should be evaluated in the ED and she said if she feels any worse or BG does not come down to less than 400 she will go to ED.       "

## 2025-04-15 NOTE — TELEPHONE ENCOUNTER
Please see note from yesterday.   Can someone please try to call patient and see how she is feeling? Thank you

## 2025-04-16 NOTE — TELEPHONE ENCOUNTER
Received message from office staff: Called patient states she is a lot of pain, her sugar is not registering it was over 500 last time check. Don't want to go to the hospital trying not to spend Easter in the hospital. No appetite so she hasn't eaten anything.     Called patient again to recommend ED evaluation, but no answer. Left .

## 2025-04-20 LAB
ALBUMIN SERPL-MCNC: 4.4 G/DL (ref 3.6–5.1)
ALBUMIN/CREAT UR: 241 MG/G CREAT
ALP SERPL-CCNC: 103 U/L (ref 31–125)
ALT SERPL-CCNC: 18 U/L (ref 6–29)
ANION GAP SERPL CALCULATED.4IONS-SCNC: 11 MMOL/L (CALC) (ref 7–17)
AST SERPL-CCNC: 13 U/L (ref 10–35)
BILIRUB SERPL-MCNC: 0.3 MG/DL (ref 0.2–1.2)
BUN SERPL-MCNC: 14 MG/DL (ref 7–25)
C PEPTIDE SERPL-MCNC: 1.19 NG/ML (ref 0.8–3.85)
CALCIUM SERPL-MCNC: 9.5 MG/DL (ref 8.6–10.2)
CHLORIDE SERPL-SCNC: 96 MMOL/L (ref 98–110)
CO2 SERPL-SCNC: 25 MMOL/L (ref 20–32)
CREAT SERPL-MCNC: 1.14 MG/DL (ref 0.5–0.99)
CREAT UR-MCNC: 76 MG/DL (ref 20–275)
EGFRCR SERPLBLD CKD-EPI 2021: 60 ML/MIN/1.73M2
GAD65 AB SER IA-ACNC: <5 IU/ML
GLUCOSE SERPL-MCNC: 517 MG/DL (ref 65–99)
ISLET CELL512 AB SER IA-ACNC: <5.4 U/ML
MICROALBUMIN UR-MCNC: 18.3 MG/DL
POTASSIUM SERPL-SCNC: 4.7 MMOL/L (ref 3.5–5.3)
PROT SERPL-MCNC: 7 G/DL (ref 6.1–8.1)
SODIUM SERPL-SCNC: 132 MMOL/L (ref 135–146)
ZNT8 AB SERPL IA-ACNC: <10 U/ML

## 2025-04-25 ENCOUNTER — OFFICE VISIT (OUTPATIENT)
Facility: HOSPITAL | Age: 45
End: 2025-04-25
Payer: COMMERCIAL

## 2025-04-25 VITALS
OXYGEN SATURATION: 98 % | SYSTOLIC BLOOD PRESSURE: 111 MMHG | HEART RATE: 101 BPM | HEIGHT: 67 IN | DIASTOLIC BLOOD PRESSURE: 76 MMHG | TEMPERATURE: 97.2 F | WEIGHT: 183.2 LBS | BODY MASS INDEX: 28.75 KG/M2

## 2025-04-25 DIAGNOSIS — M54.9 ACUTE RIGHT-SIDED BACK PAIN, UNSPECIFIED BACK LOCATION: ICD-10-CM

## 2025-04-25 DIAGNOSIS — M17.0 OSTEOARTHRITIS OF BOTH KNEES, UNSPECIFIED OSTEOARTHRITIS TYPE: ICD-10-CM

## 2025-04-25 DIAGNOSIS — L02.92 RECURRENT BOILS: ICD-10-CM

## 2025-04-25 DIAGNOSIS — M79.672 FOOT PAIN, BILATERAL: ICD-10-CM

## 2025-04-25 DIAGNOSIS — Z59.41 FOOD INSECURITY: ICD-10-CM

## 2025-04-25 DIAGNOSIS — R25.1 FUNCTIONAL TREMOR: ICD-10-CM

## 2025-04-25 DIAGNOSIS — Z79.4 INSULIN DEPENDENT TYPE 2 DIABETES MELLITUS (MULTI): ICD-10-CM

## 2025-04-25 DIAGNOSIS — E11.9 INSULIN DEPENDENT TYPE 2 DIABETES MELLITUS (MULTI): ICD-10-CM

## 2025-04-25 DIAGNOSIS — M79.671 FOOT PAIN, BILATERAL: ICD-10-CM

## 2025-04-25 LAB — POC FINGERSTICK BLOOD GLUCOSE: 217 MG/DL (ref 70–100)

## 2025-04-25 PROCEDURE — 3078F DIAST BP <80 MM HG: CPT

## 2025-04-25 PROCEDURE — 3046F HEMOGLOBIN A1C LEVEL >9.0%: CPT

## 2025-04-25 PROCEDURE — 3008F BODY MASS INDEX DOCD: CPT

## 2025-04-25 PROCEDURE — 82962 GLUCOSE BLOOD TEST: CPT | Mod: GC

## 2025-04-25 PROCEDURE — 3074F SYST BP LT 130 MM HG: CPT

## 2025-04-25 PROCEDURE — 4010F ACE/ARB THERAPY RXD/TAKEN: CPT

## 2025-04-25 PROCEDURE — 99214 OFFICE O/P EST MOD 30 MIN: CPT

## 2025-04-25 PROCEDURE — 99214 OFFICE O/P EST MOD 30 MIN: CPT | Mod: GC

## 2025-04-25 SDOH — ECONOMIC STABILITY - FOOD INSECURITY: FOOD INSECURITY: Z59.41

## 2025-04-25 ASSESSMENT — PAIN SCALES - GENERAL: PAINLEVEL_OUTOF10: 8

## 2025-04-25 NOTE — PATIENT INSTRUCTIONS
Please call vascular for carotid doppler:     Scheduling Phone Numbers:  - Specialty Provider: [phone]  - Heart/Vascular Specialist: [phone]  - [phone]  - PT/OT: [phone]  - Colonoscopy: [phone]     Please take gabapentin one pill 3 times a day instead of the 2.     Pain medicine referral.    Pharmacist referral. Someone should be in touch regarding your medications.     I increased your insulin to 65 U from 55 U long acting. Please check your glucose when you get your glucose meter.

## 2025-04-25 NOTE — PROGRESS NOTES
"Subjective   Patient ID: Patricia Vieyra is a 44 y.o. female who presents for Follow-up (Shakiness causing falls loss of memory body aches dizziness).    HPI     # IDDM   Patient states that she has had hyperglycemia for the last few weeks since insulin dose was changed.  Patient saw endocrine on 4/11/2025 where insulin dose was changed from 75 units nightly to 55 units nightly.  Patient was to continue on 15 units lispro AC.  Patient states that since this was changed she has had sugars that have not been able to be read.  She will give herself 10 units of lispro and recheck and states that her blood glucose is still in 400s.  She states that her CGM does not always read her blood glucoses.  She would like to get a prescription for glucose monitor.  States that she has not changed her diet.  When she becomes hypoglycemic she states that she feels \"groggy.\"  Denies other symptoms.    # chronic pain   # peripheral neuropathy  Patient has tried multiple pain regimens in the past.  She currently uses Tylenol, ibuprofen, gabapentin.  She states that she has used amitriptyline and Lyrica in the past without relief.  She has had pain that started 2 weeks ago in the right scapula going down the right arm which has increased in severity.  States that most of her pain medications have not been adequate to control her pain.    # staring off into space  # stuttering   # memory loss   Patient has had nonspecific neuro symptoms such as staring off into space, short-term memory loss, and stuttering.  He states that these have had insidious onset.  Her daughters have noticed the symptoms.    # recurrent falls  - falls because knee give out or because of dizziness.  She uses a rollator at baseline.  Denies hitting her head with falls.    Review of Systems    Objective   /76 (BP Location: Right arm, Patient Position: Sitting, BP Cuff Size: Adult)   Pulse 101   Temp 36.2 °C (97.2 °F) (Temporal)   Ht 1.702 m (5' 7\")   Wt 83.1 " kg (183 lb 3.2 oz)   SpO2 98%   BMI 28.69 kg/m²     Physical Exam  Constitutional:       Appearance: Normal appearance.   HENT:      Head: Normocephalic and atraumatic.   Cardiovascular:      Rate and Rhythm: Normal rate and regular rhythm.      Pulses: Normal pulses.      Heart sounds: Normal heart sounds.   Pulmonary:      Effort: Pulmonary effort is normal.      Breath sounds: Normal breath sounds.   Abdominal:      Palpations: Abdomen is soft.   Musculoskeletal:         General: Tenderness present. No swelling.      Comments: Very limited ROM at right arm.   Hyperalgesia to right inferior scapula   Skin:     General: Skin is warm and dry.   Neurological:      Mental Status: She is alert.     Assessment/Plan   44 yo F w/ PMHx of IDDM, severe peripheral neuropathy 2/2 IDDM, and HTN who presents to the clinic for follow-up on her pain and diabetes.  Her diabetic medication regimen was changed by endocrine on 4/11/2025.  Patient was to take 55 units of Lantus at night instead of 75 units.  She was to continue on her 15 units lispro AC.  Patient states that with the decrease in Lantus she has been giving herself 10 units lispro 3 times daily.  Patient states that her CGM can be defective and will not repeat blood glucoses at times.  However the times that it does take her blood glucose the levels are too high to measure.  She states that after giving herself 10 units lispro she will recheck her glucose and it is usually in the 400s.  She describes grogginess with hyperglycemia without other concerning symptoms.  Will increase her insulin dose to 65 or 55 units.  Patient to continue on 15 units lispro 3 times daily.  Patient to continue on metformin and Trulicity.    Patient has already tried and failed many multimodal pain regimens.  She has tried TCA, pregabalin, NSAIDs.  Patient appears to have complex pain syndrome secondary to osteoarthritis, neuropathy secondary to IDDM.  Will refer patient to pain medicine  for further management.    Problem List Items Addressed This Visit       Food insecurity    Relevant Orders    Referral to [facility]    Foot pain, bilateral    Overview   Comment on above: FOOT PAIN         Relevant Orders    Referral to Pain Medicine        Insulin dependent type 2 diabetes mellitus (Multi)      :: Current diabetic regimen: Trulicity 1.5 q. Weekly, Lantus 55 units nightly, lispro 15 U ac   - Will increase Lantus to 65 units.  Patient to continue on same short acting regimen.  - Referral to clinical pharmacy for adjustment.  Will reach out to Endo regarding earlier appointment for patient.    Relevant Orders    POCT Fingerstick Glucose manually resulted (Completed)    Referral to Clinical Pharmacy      Osteoarthritis of both knees, unspecified osteoarthritis type      :: chronic     Relevant Orders    Referral to Physical Therapy    Referral to Pain Medicine      Acute right-sided back pain, unspecified back location      :: Patient has exquisite pain on touch of right inferior scapula.  She does endorse some abdominal pain associated with it.  Patient will need workup for possible pancreatitis.  - Obtain lipase.  If lipase levels are within normal limit will obtain XR imaging.    Relevant Orders    Lipase      Functional tremor        Relevant Orders    Referral to Adult Neuropsychology     RTC in 3 months for HM    Seen and discussed with Dr. Trent Parra MD, MPH   Family Medicine and Preventive Health, PGY-3

## 2025-04-26 LAB — LIPASE SERPL-CCNC: 21 U/L (ref 7–60)

## 2025-05-01 ENCOUNTER — APPOINTMENT (OUTPATIENT)
Dept: ENDOCRINOLOGY | Facility: CLINIC | Age: 45
End: 2025-05-01
Payer: COMMERCIAL

## 2025-05-01 DIAGNOSIS — E11.9 TYPE 2 DIABETES MELLITUS WITHOUT COMPLICATION, WITH LONG-TERM CURRENT USE OF INSULIN: Primary | ICD-10-CM

## 2025-05-01 DIAGNOSIS — E11.9 INSULIN DEPENDENT TYPE 2 DIABETES MELLITUS (MULTI): ICD-10-CM

## 2025-05-01 DIAGNOSIS — Z79.4 INSULIN DEPENDENT TYPE 2 DIABETES MELLITUS (MULTI): ICD-10-CM

## 2025-05-01 DIAGNOSIS — Z79.4 TYPE 2 DIABETES MELLITUS WITHOUT COMPLICATION, WITH LONG-TERM CURRENT USE OF INSULIN: Primary | ICD-10-CM

## 2025-05-02 DIAGNOSIS — M89.8X1 PAIN OF RIGHT SCAPULA: Primary | ICD-10-CM

## 2025-05-06 ENCOUNTER — APPOINTMENT (OUTPATIENT)
Dept: VASCULAR MEDICINE | Facility: HOSPITAL | Age: 45
End: 2025-05-06
Payer: COMMERCIAL

## 2025-05-12 ENCOUNTER — APPOINTMENT (OUTPATIENT)
Dept: PAIN MEDICINE | Facility: CLINIC | Age: 45
End: 2025-05-12
Payer: COMMERCIAL

## 2025-05-13 ENCOUNTER — APPOINTMENT (OUTPATIENT)
Dept: RADIOLOGY | Facility: HOSPITAL | Age: 45
End: 2025-05-13
Payer: COMMERCIAL

## 2025-05-13 ENCOUNTER — APPOINTMENT (OUTPATIENT)
Dept: PHARMACY | Facility: HOSPITAL | Age: 45
End: 2025-05-13
Payer: COMMERCIAL

## 2025-05-13 ENCOUNTER — APPOINTMENT (OUTPATIENT)
Dept: VASCULAR MEDICINE | Facility: HOSPITAL | Age: 45
End: 2025-05-13
Payer: COMMERCIAL

## 2025-05-14 ENCOUNTER — APPOINTMENT (OUTPATIENT)
Dept: DERMATOLOGY | Facility: CLINIC | Age: 45
End: 2025-05-14
Payer: COMMERCIAL

## 2025-05-19 ENCOUNTER — APPOINTMENT (OUTPATIENT)
Dept: PHARMACY | Facility: HOSPITAL | Age: 45
End: 2025-05-19
Payer: COMMERCIAL

## 2025-05-19 DIAGNOSIS — E11.9 INSULIN DEPENDENT TYPE 2 DIABETES MELLITUS (MULTI): ICD-10-CM

## 2025-05-19 DIAGNOSIS — Z79.4 INSULIN DEPENDENT TYPE 2 DIABETES MELLITUS (MULTI): ICD-10-CM

## 2025-05-19 NOTE — ASSESSMENT & PLAN NOTE
Assessment:    Patient's DMT2 is currently uncontrolled based on most recent hemoglobin A1C level of 13.3% (4/11/25). Patient currently on metformin XR 1000mg PO QAM with breakfast, Jardiance 10mg PO daily, Trulicity 3mg subcutaneous weekly, Lantus SoloStar pen 65 Units subcutaneous at bedtime, and Humalog Tempo Pen 15 Units subcutaneous TIDAC and reports compliance with meds and denies any ADRs to meds. Patient did not have home glucose readings available at today's visit with clinical pharmacist. Of note, patient follows with endocrinology.    Plan:    - Continue metformin XR 1000mg PO QAM with breakfast  - Continue Jardiance 10mg PO daily  - Continue Trulicity 3mg subcutaneous weekly  - Continue  Lantus SoloStar pen 65 Units subcutaneous at bedtime  - Continue Humalog Tempo Pen 15 Units subcutaneous TIDAC  - Instructed patient to check glucose level at home every day before meals and 2 hours after meals and have readings available at next visit with clinical pharmacist             - Instructed patient to call clinical pharmacist if she ever obtains a glucose reading of less than 70 mg/dL (patient already aware of how to properly correct hypoglycemia if it ever occurs)  - Follow-up with endocrinology as scheduled

## 2025-05-19 NOTE — PROGRESS NOTES
Primary Care Clinical Pharmacist Initial Visit      Date of Initial Visit: 5/19/25  Disease state(s) to be managed by clinical pharmacist: DMT2  Referring Provider: Dr. Elio Newman (PCP)  Most recent appointment with PCP: 4/25/25  Next appointment with PCP: 5/23/25          Endocrinologist: Dr. Danni Akhtar  Most recent appointment with endocrinology: 4/11/25  Next appointment with endocrinology: 8/6/25         Subjective:    Patient is a 43 YO F who presents virtually to visit with clinical pharmacist for initial visit for management of DMT2. Patient gives consent to have visit conducted via telehealth. Patient was referred to clinical pharmacist for management of disease state(s) by PCP. At today's visit, patient reports taking metformin XR 1000mg PO QAM with breakfast, Jardiance 10mg PO daily, Trulicity 3mg subcutaneous weekly, Lantus SoloStar pen 65 Units subcutaneous at bedtime, and Humalog Tempo Pen 15 Units subcutaneous TIDAC and reports compliance with meds and denies any ADRs to meds.      Patient reports checking glucose level at home but did not have home glucose readings available at today's visit with clinical pharmacist. Per patient, home glucose readings have ranged from 52-over 400 mg/dL since being on Lantus SoloStar pen 65 Units subcutaneous at bedtime. Patient states that reading of 52 mg/dL was the only reading less than 70 mg/dL that she obtained during this time period and attributes low reading to decrease in carbohydrate intake.       Objective:     Most recent hemoglobin A1C level: 13.3% (4/11/25) (goal: less than 7%)    Most recent eGFR: 60 (4/11/25)  Most recent vitamin B12 level: none on file for the past 12 months  Most recent Albumin/SCr ratio:  241 (4/11/25)-patient already receiving nephroprotection from lisinopril 10mg PO daily  Most recent AST/ALT: 13/18  (4/11/25)  Most recent diabetic retinopathy exam: 10/3/24-mild non-proliferative diabetic retinopathy (NPDR) both eyes with DME      Most recent influenza vaccine: 10/1/24  Most recent pneumococcal vaccine: 11/22/24 (PCV 20)    *no lipid panel on file for the past 12 months      Assessment:    Patient's DMT2 is currently uncontrolled based on most recent hemoglobin A1C level of 13.3% (4/11/25). Patient currently on metformin XR 1000mg PO QAM with breakfast, Jardiance 10mg PO daily, Trulicity 3mg subcutaneous weekly, Lantus SoloStar pen 65 Units subcutaneous at bedtime, and Humalog Tempo Pen 15 Units subcutaneous TIDAC and reports compliance with meds and denies any ADRs to meds. Patient did not have home glucose readings available at today's visit with clinical pharmacist. Of note, patient follows with endocrinology.    Plan:    - Continue metformin XR 1000mg PO QAM with breakfast  - Continue Jardiance 10mg PO daily  - Continue Trulicity 3mg subcutaneous weekly  - Continue  Lantus SoloStar pen 65 Units subcutaneous at bedtime  - Continue Humalog Tempo Pen 15 Units subcutaneous TIDAC  - Instructed patient to check glucose level at home every day before meals and 2 hours after meals and have readings available at next visit with clinical pharmacist             - Instructed patient to call clinical pharmacist if she ever obtains a glucose reading of less than 70 mg/dL (patient already aware of how to properly correct hypoglycemia if it ever occurs)  - Follow-up with endocrinology as scheduled     The patient verbalized understanding of everything discussed at today's visit and agrees with plan formulated by clinical pharmacist    Next visit with clinical pharmacist:  6/2/25 at 9 AM via telehealth      Continue all meds under the continuation of care with the referring provider and clinical pharmacy team.    Patient Education    The patient was counseled on the following regarding DMT2:    - The side effects of the  medications patient uses to treat disease state(s) and what to do if they occur        LEXI Mishra, PharmD, BCACP  Clinical Pharmacy Specialist, [facility]

## 2025-05-23 ENCOUNTER — APPOINTMENT (OUTPATIENT)
Facility: HOSPITAL | Age: 45
End: 2025-05-23
Payer: MEDICAID

## 2025-05-23 ENCOUNTER — OFFICE VISIT (OUTPATIENT)
Facility: HOSPITAL | Age: 45
End: 2025-05-23
Payer: MEDICAID

## 2025-05-23 VITALS
TEMPERATURE: 97.5 F | HEART RATE: 109 BPM | RESPIRATION RATE: 16 BRPM | DIASTOLIC BLOOD PRESSURE: 80 MMHG | SYSTOLIC BLOOD PRESSURE: 114 MMHG | HEIGHT: 67 IN | BODY MASS INDEX: 28.63 KG/M2 | OXYGEN SATURATION: 98 % | WEIGHT: 182.4 LBS

## 2025-05-23 DIAGNOSIS — F31.60 BIPOLAR AFFECTIVE DISORDER, CURRENT EPISODE MIXED, CURRENT EPISODE SEVERITY UNSPECIFIED (MULTI): ICD-10-CM

## 2025-05-23 DIAGNOSIS — E11.69 TYPE 2 DIABETES MELLITUS WITH OTHER SPECIFIED COMPLICATION, WITH LONG-TERM CURRENT USE OF INSULIN: ICD-10-CM

## 2025-05-23 DIAGNOSIS — F31.5 BIPOLAR DISORDER, CURRENT EPISODE DEPRESSED, SEVERE, WITH PSYCHOTIC FEATURES (MULTI): ICD-10-CM

## 2025-05-23 DIAGNOSIS — F31.81 BIPOLAR II DISORDER (MULTI): ICD-10-CM

## 2025-05-23 DIAGNOSIS — K59.09 OTHER CONSTIPATION: ICD-10-CM

## 2025-05-23 DIAGNOSIS — F17.200 TOBACCO DEPENDENCY: Primary | ICD-10-CM

## 2025-05-23 DIAGNOSIS — J45.909 UNCOMPLICATED ASTHMA, UNSPECIFIED ASTHMA SEVERITY, UNSPECIFIED WHETHER PERSISTENT (HHS-HCC): ICD-10-CM

## 2025-05-23 DIAGNOSIS — Z79.4 TYPE 2 DIABETES MELLITUS WITH OTHER SPECIFIED COMPLICATION, WITH LONG-TERM CURRENT USE OF INSULIN: ICD-10-CM

## 2025-05-23 RX ORDER — DOCUSATE SODIUM 100 MG/1
100 CAPSULE, LIQUID FILLED ORAL 2 TIMES DAILY
Qty: 60 CAPSULE | Refills: 11 | Status: SHIPPED | OUTPATIENT
Start: 2025-05-23

## 2025-05-23 RX ORDER — BLOOD-GLUCOSE SENSOR
EACH MISCELLANEOUS
Qty: 90 EACH | Refills: 11 | Status: SHIPPED | OUTPATIENT
Start: 2025-05-23

## 2025-05-23 ASSESSMENT — PAIN SCALES - GENERAL: PAINLEVEL_OUTOF10: 6

## 2025-05-23 NOTE — PROGRESS NOTES
Tobacco Counseling  The patient smokes cigarettes and desires to quit.  We created the following quit plan:  Prescribed the following medications: nicotine patch.

## 2025-05-23 NOTE — PROGRESS NOTES
"Subjective   Patient ID: Patricia Vieyra is a 44 y.o. female who presents for Follow-up and Med Refill.    HPI   42 yo F w/ PMHx of IDDM, severe peripheral neuropathy 2/2 IDDM, and HTN who presents to the clinic for follow-up on her diabetes.     # IDDM   - Pt has CGM but states that she ran out of sensors.   - clinical pharmacy referral placed. Not able to adjust medications during last encounter with clinical pharm as pt states that she did not have any CGM sensors left.   - she would like an order for a manual glucometer.     # memory changes   - pt states that she has had increased memory deficits recently. She states that family members have mentioned how she will often repeat things that she has said or forget things. There appears to be deficits with short -term memory. Denies forgetting roads or ways to walk home, or getting lost inside the house.  - MOCA of 6/30. However there may be some component of decreased education attainment.      Review of Systems  12 point ROS negative except as stated in HPI.     Objective   /80 (BP Location: Right arm, Patient Position: Sitting, BP Cuff Size: Adult)   Pulse 109   Temp 36.4 °C (97.5 °F) (Temporal)   Resp 16   Ht 1.702 m (5' 7\")   Wt 82.7 kg (182 lb 6.4 oz)   SpO2 98%   BMI 28.57 kg/m²     Physical Exam  Gen: NAD, nontox  HEENT: NCAT. MMM.  RESP: no resp distress, talks in full sentences, CTABL  CVS: non-tachycardic, RRR  ABD: Soft, non-distended  Psych: appropriately answers questions. normal mood and affect  MSK: appears to have Full range of motion on all extremities.  NEURO: no focal neurological deficits, no hemineglect, CN II- XII intact     Assessment/Plan   42 yo F w/ PMHx of IDDM, severe peripheral neuropathy 2/2 IDDM, and HTN who presents to the clinic for follow-up on her diabetes. She states that she has not been able to check her glucose levels due to not having her glucose sensors or a glucometer. Sensors were refilled and a glucometer " will be sent to DME. Pt also states that she has had continued deficits in short term memory. She has a neuropsych appointment on 12/4/25. Pt was given a MOCA with results that may be affected by the patient's education level. MOCA score was 6/30. Of note pt ekta a clock with right sided neglect but does not have neglect on physical exam.     Diagnoses and all orders for this visit:  Tobacco dependency  Type 2 diabetes mellitus with other specified complication, with long-term current use of insulin  -     FreeStyle Avila 3 Sensor device; Apply to skin  Other constipation  -     docusate sodium (Colace) 100 mg capsule; Take 1 capsule (100 mg) by mouth 2 times a day.  BMI 28.0-28.9,adult    RTC in 3 months for HM with Dr. Kendrick,    Discussed with Dr. Workman,     Edvin Parra MD, MPH   Family Medicine and Preventive Health, PGY-3

## 2025-05-23 NOTE — PATIENT INSTRUCTIONS
Quitting Smoking    Quitting smoking is the most important step you can take to improve your health. We're glad you have set a goal to improve your health.    Quit Smoking Resources    In addition to medications, use the STAR plan to help you successfully quit.   Stick with your quit date!   Tell friends, family, and coworkers your quit date. Request their understanding and support.  Anticipate and prepare for challenges. Some examples are withdrawal symptoms, being around others who smoke, and drinking alcohol.  Remove all tobacco products and paraphernalia from your environment. Make your home and vehicles smoke-free.    Free resources for additional support:  National tobacco quitline: 1-800-QUIT-NOW (1-200.780.1945).  SmokefreeTXT is a free text program to assist you in quitting. Visit https://www.smokefree.gov/smokefreetxt for more information.  Feel free to call your care manager at ([phone]) for additional support.

## 2025-05-30 DIAGNOSIS — R32 INCONTINENCE IN FEMALE: ICD-10-CM

## 2025-05-30 DIAGNOSIS — K59.09 OTHER CONSTIPATION: ICD-10-CM

## 2025-05-30 DIAGNOSIS — R32 INCONTINENCE IN FEMALE: Primary | ICD-10-CM

## 2025-05-30 RX ORDER — DIAPER,BRIEF,ADULT, DISPOSABLE
1 EACH MISCELLANEOUS DAILY
Qty: 280 EACH | Refills: 11 | Status: SHIPPED | OUTPATIENT
Start: 2025-05-30

## 2025-06-02 ENCOUNTER — APPOINTMENT (OUTPATIENT)
Dept: PHARMACY | Facility: HOSPITAL | Age: 45
End: 2025-06-02
Payer: COMMERCIAL

## 2025-06-02 DIAGNOSIS — E11.9 INSULIN DEPENDENT TYPE 2 DIABETES MELLITUS (MULTI): ICD-10-CM

## 2025-06-02 DIAGNOSIS — Z79.4 INSULIN DEPENDENT TYPE 2 DIABETES MELLITUS (MULTI): ICD-10-CM

## 2025-06-02 NOTE — PROGRESS NOTES
Primary Care Clinical Pharmacist Follow-Up Visit    Date of Follow-Up Visit: 6/2/25  Date of Initial Visit: 5/19/25  Disease state(s) to be managed by clinical pharmacist: DMT2  Referring Provider: Dr. Elio Newman (PCP)  Most recent appointment with PCP: 5/23/25    Next appointment with PCP: not yet scheduled        Endocrinologist: Dr. Danni Akhtar  Most recent appointment with endocrinology: 4/11/25  Next appointment with endocrinology: 8/6/25         Subjective:    Patient is a 43 YO F who presents virtually to visit with clinical pharmacist for follow-up visit for management of DMT2. Patient gives consent to have visit conducted via telehealth. Patient was referred to clinical pharmacist for management of disease state(s) by PCP. At last visit, patient was continued on metformin XR 1000mg PO QAM with breakfast, Jardiance 10mg PO daily, Trulicity 3mg subcutaneous weekly, Lantus SoloStar pen 65 Units subcutaneous at bedtime, and Humalog Tempo Pen 15 Units subcutaneous TIDAC. At today's visit, patient reports taking metformin XR 1000mg PO QAM with breakfast, Jardiance 10mg PO daily, Trulicity 3mg subcutaneous weekly, Lantus SoloStar pen 65 Units subcutaneous at bedtime, and Humalog Tempo Pen 15 Units subcutaneous TIDAC and reports compliance with meds and denies any ADRs to meds.    Diet: Patient reports not eating half of the days of the week d/t a combination of not being able to afford food and not having any appetite.        Objective:     Most recent hemoglobin A1C level: 13.3% (4/11/25) (goal: less than 7%)    Below are patient's home glucose readings since last visit:    Before breakfast: 130, 190, 296, 293, 130, 103, 107, 54*, 203, 118, 303, 216, 317 (average: 189 mg/dL, goal:  mg/dL)    2 hours after breakfast: 240, 283, 390, 303, 246, 290, 233, 190, 290, 216, 386, 372, 423   (average: 297 mg/dL, goal: less than 180 mg/dL)        Before lunch: 283, 296, 290, 183, 236, 296, 240, 216, 303, 186, 296, 360, 290 (average: 267 mg/dL, goal:  mg/dL)    2 hours after lunch: 303, 486, 186, 260, 303, 202, 403, 280, 318, 218, 323, 420, 289  (average: 307 mg/dL, goal: less than 180 mg/dL)        Before dinner: 290, 390, 230,  190, 492, 390, 286, 303, 303, 372, 283, 389, 483 (average: 339 mg/dL, goal:  mg/dL)    2 hours after dinner: 206, 293, 296, 203, 486, 210, 316, 418, 486, 303, 432, 419, 296  (average: 336 mg/dL, goal: less than 180 mg/dL)            *hypoglycemia of uknown etiology (did not inform clinical pharmacist of low reading prior to today's visit)      Most recent eGFR: 60 (4/11/25)  Most recent vitamin B12 level: none on file for the past 12 months  Most recent Albumin/SCr ratio:  241 (4/11/25)-patient already receiving nephroprotection from lisinopril 10mg PO daily  Most recent AST/ALT: 13/18 (4/11/25)  Most recent diabetic retinopathy exam: 10/3/24-mild non-proliferative diabetic retinopathy (NPDR) both eyes with DME      Most recent influenza vaccine: 10/1/24  Most recent pneumococcal vaccine: 11/22/24 (PCV 20)    *no lipid panel on file for the past 12 months      Assessment:    Patient's DMT2 is currently uncontrolled based on most recent hemoglobin A1C level of 13.3% (4/11/25) and averages of patient's home glucose readings. Patient currently on metformin XR 1000mg PO QAM with breakfast, Jardiance 10mg PO daily, Trulicity 3mg subcutaneous weekly, Lantus SoloStar pen 65 Units subcutaneous at bedtime, and Humalog Tempo Pen 15 Units subcutaneous TIDAC and reports compliance with meds and denies any ADRs to meds. Patient had one episode of hypoglycemia of unknown etiology since last visit. Of note, patient follows with endocrinology.    Plan:    - Continue metformin XR 1000mg PO QAM with breakfast  - Continue Jardiance 10mg PO daily  - Continue Trulicity 3mg subcutaneous  weekly  - Continue  Lantus SoloStar pen 65 Units subcutaneous at bedtime (instructed patient to skip dose if does not eat an evening meal)  - Continue Humalog Tempo Pen 15 Units subcutaneous TIDAC  - Instructed patient to check glucose level at home every day before meals and 2 hours after meals and have readings available at next visit with clinical pharmacist             - Instructed patient to call clinical pharmacist if she ever obtains a glucose reading of less than 70 mg/dL (patient already aware of how to properly correct hypoglycemia if it ever occurs)  - Follow-up with endocrinology as scheduled     The patient verbalized understanding of everything discussed at today's visit and agrees with plan formulated by clinical pharmacist    Next visit with clinical pharmacist:  6/16/25 at 10 AM via telehealth      Continue all meds under the continuation of care with the referring provider and clinical pharmacy team.    Patient Education    The patient is aware of the following regarding DMT2:    - The side effects of the medications patient uses to treat disease state(s) and what to do if they occur        LEXI Mishra, PharmD, BCACP  Clinical Pharmacy Specialist, [facility]

## 2025-06-02 NOTE — ASSESSMENT & PLAN NOTE
Assessment:    Patient's DMT2 is currently uncontrolled based on most recent hemoglobin A1C level of 13.3% (4/11/25) and averages of patient's home glucose readings. Patient currently on metformin XR 1000mg PO QAM with breakfast, Jardiance 10mg PO daily, Trulicity 3mg subcutaneous weekly, Lantus SoloStar pen 65 Units subcutaneous at bedtime, and Humalog Tempo Pen 15 Units subcutaneous TIDAC and reports compliance with meds and denies any ADRs to meds. Patient had one episode of hypoglycemia of unknown etiology since last visit. Of note, patient follows with endocrinology.    Plan:    - Continue metformin XR 1000mg PO QAM with breakfast  - Continue Jardiance 10mg PO daily  - Continue Trulicity 3mg subcutaneous weekly  - Continue  Lantus SoloStar pen 65 Units subcutaneous at bedtime (instructed patient to skip dose if does not eat an evening meal)  - Continue Humalog Tempo Pen 15 Units subcutaneous TIDAC  - Instructed patient to check glucose level at home every day before meals and 2 hours after meals and have readings available at next visit with clinical pharmacist             - Instructed patient to call clinical pharmacist if she ever obtains a glucose reading of less than 70 mg/dL (patient already aware of how to properly correct hypoglycemia if it ever occurs)  - Follow-up with endocrinology as scheduled

## 2025-06-02 NOTE — Clinical Note
Patient requesting a new prescription for Depends (would like to receive the highest quantity possible and would like prescription sent to [facility] on [address] Thank you.

## 2025-06-03 ENCOUNTER — TELEPHONE (OUTPATIENT)
Facility: HOSPITAL | Age: 45
End: 2025-06-03
Payer: COMMERCIAL

## 2025-06-03 RX ORDER — DOCUSATE SODIUM 100 MG/1
100 CAPSULE, LIQUID FILLED ORAL 2 TIMES DAILY
Qty: 60 CAPSULE | Refills: 11 | Status: SHIPPED | OUTPATIENT
Start: 2025-06-03

## 2025-06-03 RX ORDER — DIAPER,BRIEF,ADULT, DISPOSABLE
1 EACH MISCELLANEOUS DAILY
Qty: 280 EACH | Refills: 11 | Status: SHIPPED | OUTPATIENT
Start: 2025-06-03

## 2025-06-11 DIAGNOSIS — R32 INCONTINENCE IN FEMALE: ICD-10-CM

## 2025-06-11 RX ORDER — DIAPER,BRIEF,ADULT, DISPOSABLE
1 EACH MISCELLANEOUS DAILY
Qty: 280 EACH | Refills: 11 | Status: SHIPPED | OUTPATIENT
Start: 2025-06-11

## 2025-06-13 ENCOUNTER — APPOINTMENT (OUTPATIENT)
Dept: OPHTHALMOLOGY | Facility: CLINIC | Age: 45
End: 2025-06-13
Payer: COMMERCIAL

## 2025-06-16 ENCOUNTER — APPOINTMENT (OUTPATIENT)
Dept: PHARMACY | Facility: HOSPITAL | Age: 45
End: 2025-06-16
Payer: COMMERCIAL

## 2025-06-17 ENCOUNTER — TELEMEDICINE (OUTPATIENT)
Dept: PHARMACY | Facility: HOSPITAL | Age: 45
End: 2025-06-17
Payer: COMMERCIAL

## 2025-06-17 DIAGNOSIS — Z79.4 INSULIN DEPENDENT TYPE 2 DIABETES MELLITUS (MULTI): ICD-10-CM

## 2025-06-17 DIAGNOSIS — E11.9 INSULIN DEPENDENT TYPE 2 DIABETES MELLITUS (MULTI): ICD-10-CM

## 2025-06-17 RX ORDER — DULAGLUTIDE 4.5 MG/.5ML
4.5 INJECTION, SOLUTION SUBCUTANEOUS WEEKLY
Qty: 2 ML | Refills: 11 | Status: SHIPPED | OUTPATIENT
Start: 2025-06-17

## 2025-06-17 NOTE — PROGRESS NOTES
Primary Care Clinical Pharmacist Follow-Up Visit    Date of Follow-Up Visit: 6/17/25  Date of Initial Visit: 5/19/25  Disease state(s) to be managed by clinical pharmacist: DMT2  Referring Provider: Dr. Elio Newman (PCP)  Most recent appointment with PCP: 5/23/25    Next appointment with PCP: not yet scheduled        Endocrinologist: Dr. Danni Akhtar  Most recent appointment with endocrinology: 4/11/25  Next appointment with endocrinology: 8/6/25         Subjective:    Patient is a 43 YO F who presents virtually to visit with clinical pharmacist for follow-up visit for management of DMT2. Patient gives consent to have visit conducted via telehealth. Patient was referred to clinical pharmacist for management of disease state(s) by PCP. At last visit, patient was continued on metformin XR 1000mg PO QAM with breakfast, Jardiance 10mg PO daily, Trulicity 3mg subcutaneous weekly, Lantus SoloStar pen 65 Units subcutaneous at bedtime (instructed patient to skip dose if does not eat an evening meal) , and Humalog Tempo Pen 15 Units subcutaneous TIDAC. At today's visit, patient reports taking metformin XR 1000mg PO QAM with breakfast, Jardiance 10mg PO daily, Trulicity 3mg subcutaneous weekly, Lantus SoloStar pen 65 Units subcutaneous at bedtime (skips dose if does not eat an evening meal) , and Humalog Tempo Pen 15 Units subcutaneous TIDAC and reports compliance with meds and denies any ADRs to meds.    Diet: Patient reports not eating half of the days of the week d/t a combination of not being able to afford food and not having any appetite.        Objective:     Most recent hemoglobin A1C level: 13.3% (4/11/25) (goal: less than 7%)    Below are patient's home glucose readings since 6/10/25:    Before breakfast: 386, 392, 389, 410, 86, 94, 310    (average: 295 mg/dL, goal:  mg/dL)    Before lunch:  282, 189, 210, 304, 426, 418, 382   (average: 316 mg/dL, goal:  mg/dL)    Before dinner: 389, 326, 214, 284, 416, 218, 212   (average: 294 mg/dL, goal:  mg/dL)         Patient did not obtain any readings less than 70 mg/dL since last visit      Most recent eGFR: 60 (4/11/25)  Most recent vitamin B12 level: none on file for the past 12 months  Most recent Albumin/SCr ratio:  241 (4/11/25)-patient already receiving nephroprotection from lisinopril 10mg PO daily  Most recent AST/ALT: 13/18 (4/11/25)  Most recent diabetic retinopathy exam: 10/3/24-mild non-proliferative diabetic retinopathy (NPDR) both eyes with DME      Most recent pneumococcal vaccine: 11/22/24 (PCV 20)    *no lipid panel on file for the past 12 months      Assessment:    Patient's DMT2 is currently uncontrolled based on most recent hemoglobin A1C level of 13.3% (4/11/25) and averages of patient's home glucose readings. Patient currently on metformin XR 1000mg PO QAM with breakfast, Jardiance 10mg PO daily, Trulicity 3mg subcutaneous weekly, Lantus SoloStar pen 65 Units subcutaneous at bedtime (skips dose if does not eat an evening meal), and Humalog Tempo Pen 15 Units subcutaneous TIDAC and reports compliance with meds and denies any ADRs to meds. Of note, patient follows with endocrinology.    Plan:    - Increase Trulicity dose from 3mg subcutaneous weekly to 4.5mg subcutaneous weekly (prescription for Trulicity 4.5mg subcutaneous weekly electronically sent to patient's preferred pharmacy)              - Instructed patient to decrease dose back down to 3mg subcutaneous weekly if unable to tolerate 4.5mg subcutaneous weekly (has 7 3mg pen injectors left at home)  - Continue metformin XR 1000mg PO QAM with breakfast  - Continue Jardiance 10mg PO daily  - Continue  Lantus SoloStar pen 65 Units subcutaneous at bedtime (skip dose if no evening meal)  - Continue Humalog Tempo Pen 15 Units subcutaneous TIDAC  - Instructed patient to check  glucose level at home every day before meals and 2 hours after meals and have readings available at next visit with clinical pharmacist  - Instructed patient to go to lab on 7/14/25 to have hemoglobin A1C, CMP, fasting lipid panel, and vitamin B12 level checked  - Follow-up with endocrinology as scheduled     The patient verbalized understanding of everything discussed at today's visit and agrees with plan formulated by clinical pharmacist    Next visit with clinical pharmacist:  7/15/25 at 9:40 AM via telehealth      Continue all meds under the continuation of care with the referring provider and clinical pharmacy team.    Patient Education    The patient is aware of the following regarding DMT2:    - The side effects of the medications patient uses to treat disease state(s) and what to do if they occur        LEXI Mishra, PharmD, BCACP  Clinical Pharmacy Specialist, [facility]

## 2025-06-17 NOTE — ASSESSMENT & PLAN NOTE
Assessment:    Patient's DMT2 is currently uncontrolled based on most recent hemoglobin A1C level of 13.3% (4/11/25) and averages of patient's home glucose readings. Patient currently on metformin XR 1000mg PO QAM with breakfast, Jardiance 10mg PO daily, Trulicity 3mg subcutaneous weekly, Lantus SoloStar pen 65 Units subcutaneous at bedtime (skips dose if does not eat an evening meal), and Humalog Tempo Pen 15 Units subcutaneous TIDAC and reports compliance with meds and denies any ADRs to meds. Of note, patient follows with endocrinology.    Plan:    - Increase Trulicity dose from 3mg subcutaneous weekly to 4.5mg subcutaneous weekly (prescription for Trulicity 4.5mg subcutaneous weekly electronically sent to patient's preferred pharmacy)              - Instructed patient to decrease dose back down to 3mg subcutaneous weekly if unable to tolerate 4.5mg subcutaneous weekly (has 7 3mg pen injectors left at home)  - Continue metformin XR 1000mg PO QAM with breakfast  - Continue Jardiance 10mg PO daily  - Continue  Lantus SoloStar pen 65 Units subcutaneous at bedtime (skip dose if no evening meal)  - Continue Humalog Tempo Pen 15 Units subcutaneous TIDAC  - Instructed patient to check glucose level at home every day before meals and 2 hours after meals and have readings available at next visit with clinical pharmacist  - Instructed patient to go to lab on 7/14/25 to have hemoglobin A1C, CMP, fasting lipid panel, and vitamin B12 level checked  - Follow-up with endocrinology as scheduled

## 2025-06-17 NOTE — Clinical Note
Delvis Ayala,           Patient states that pharmacy is waiting on you to send them provider Medicaid ID number in order to process the prescription you sent in for patient. Could you please address? Thank you.

## 2025-06-18 ENCOUNTER — CLINICAL SUPPORT (OUTPATIENT)
Facility: HOSPITAL | Age: 45
End: 2025-06-18
Payer: COMMERCIAL

## 2025-06-18 NOTE — PROGRESS NOTES
Food For Life  Diet Recommendation 1: Healthy Eating  Diet Recommendation 2: Soft (Chewing Difficulty)  Diet Recommendation 3: Healthy Eating  Food Intolerance Avoidance: Mushrooms, coconut  Nutrition Goals Stated: manages diabetes well. Stated previous A1C of 7. Daughter and son cook meals at home. They never go out to eat. Patient really enjoys fruit.  Household Size: 7 Members (4 Max/Household)  Interventions: Referral Number: 4th 6 Mo Referral 2 yrs (Referrals may not be consecutive)  Interventions: Visit Number: 5 of 6 Visits - Max 6 Visits/Referral Each 6 Mo Period  Education Today: My[employer] Meals  Follow Up Notes for Future Visits: Limits sugar for diabetes. Veg intake is a bit of a challenge because she can't eat anything crunchy 2/2 teeth  Recipes Today: none today.  Grains: 0-25% Whole  Fruit: % Fresh  Vegetables: Fresh - 100%  Proteins: 0 Plant-based Items  Dairy: 25-50% Lowfat  Originating Site of Referral Order: [facility]  Initials of RD Assisting Today: anya

## 2025-07-14 ENCOUNTER — OFFICE VISIT (OUTPATIENT)
Dept: OTOLARYNGOLOGY | Facility: HOSPITAL | Age: 45
End: 2025-07-14
Payer: COMMERCIAL

## 2025-07-14 VITALS — TEMPERATURE: 97.3 F | HEIGHT: 67 IN | BODY MASS INDEX: 27.31 KG/M2 | WEIGHT: 174 LBS

## 2025-07-14 DIAGNOSIS — R13.10 DYSPHAGIA, UNSPECIFIED TYPE: ICD-10-CM

## 2025-07-14 PROCEDURE — 99204 OFFICE O/P NEW MOD 45 MIN: CPT | Performed by: NURSE PRACTITIONER

## 2025-07-14 PROCEDURE — 3046F HEMOGLOBIN A1C LEVEL >9.0%: CPT | Performed by: NURSE PRACTITIONER

## 2025-07-14 PROCEDURE — 4010F ACE/ARB THERAPY RXD/TAKEN: CPT | Performed by: NURSE PRACTITIONER

## 2025-07-14 PROCEDURE — 99214 OFFICE O/P EST MOD 30 MIN: CPT | Mod: 25 | Performed by: NURSE PRACTITIONER

## 2025-07-14 PROCEDURE — 3008F BODY MASS INDEX DOCD: CPT | Performed by: NURSE PRACTITIONER

## 2025-07-14 PROCEDURE — 31575 DIAGNOSTIC LARYNGOSCOPY: CPT | Performed by: NURSE PRACTITIONER

## 2025-07-14 RX ORDER — OMEPRAZOLE 20 MG/1
20 CAPSULE, DELAYED RELEASE ORAL DAILY
Qty: 30 CAPSULE | Refills: 11 | Status: SHIPPED | OUTPATIENT
Start: 2025-07-14 | End: 2026-07-14

## 2025-07-14 ASSESSMENT — ENCOUNTER SYMPTOMS
LOSS OF SENSATION IN FEET: 1
OCCASIONAL FEELINGS OF UNSTEADINESS: 1
DEPRESSION: 1

## 2025-07-14 NOTE — PATIENT INSTRUCTIONS
Melinda Pandya to the clinic of Ailyn Gregorio CNP. We are here to assist you through your ENT care at [facility].    My office number is [phone]. This number is the most direct way to communicate with the office. Lashaun is my  and she answers the office phone from 8am-4pm Mon-Fri. She can help you with many general questions and information. Questions that she cannot answer will be directed appropriately to me. You may need to leave a message. In this case, someone from the team will call you back.    Sometimes other team members will also be involved in your care. This may include dieticians, social workers, speech therapists, medical oncologist or radiation oncologists. I will provide these referrals as needed. Please let me know if you would like to request a specific referral.    I look forward to working with you to meet your healthcare goals.     Recommendations:  -Start Omeprazole 20 mg daily 30 minutes prior to breakfast  -GERD patient information sheet discussed and given to patient  -Throat clearing a cough patient information discussed and given to patient  -Follow up in 3 months, repeat scope exam at that time if no improvement  -Modified Barium Swallow Study and Esophagram referral, will call if abnormal results  These alginate products are available for purchase on Amazon:    Esophageal Guardian  Gaviscon Advance (must come from the [place])  Reflux Gourmet  RefluxRaft    Please take as directed.    Sample images. Product may appear different than pictured:  .    -Patient verbalized understanding             Ways to Help Prevent Falls at Home    Quick Tips   ? Ask for help if you need it. Most people want to help!   ? Get up slowly after sitting or laying down   ? Wear a medical alert device or keep cell phone in your pocket   ? Use night lights, especially areas near a bathroom   ? Keep the items you use often within reach on a small stool or end table   ? Use an  assistive device such as walker or cane, as directed by provider/physical therapy   ? Use a non-slip mat and grab bars in your bathroom. Look for home health sections for best options     Other Areas to Focus On   ? Exercise and nutrition: Regular exercise or taking a falls prevention class are great ways improve strength and balance. Don’t forget to stay hydrated and bring a snack!   ? Medicine side effects: Some medicines can make you sleepy or dizzy, which could cause a fall. Ask your healthcare provider about the side effects your medicines could cause. Be sure to let them know if you take any vitamins or supplements as well.   ? Tripping hazards: Remove items you could trip on, such as loose mats, rugs, cords, and clutter. Wear closed toe shoes with rubber soles.   ? Health and wellness: Get regular checkups with your healthcare provider, plus routine vision and hearing screenings. Talk with your healthcare provider about:   o Your medicines and the possible side effects - bring them in a bag if that is easier!   o Problems with balance or feeling dizzy   o Ways to promote bone health, such as Vitamin D and calcium supplements   o Questions or concerns about falling     *Ask your healthcare team if you have questions     [facility], 2022

## 2025-07-14 NOTE — PROGRESS NOTES
[facility] Note          25  Patricia Vieyra is a 44 y.o. year old female patient with dysphagia referred for Referral to ENT.      : 1980    HPI:  Patricia Vieyra is a 44 y.o. female who has been having trouble swallowing since her surgery 3 years ago to remove a mass from her vocal cord. She had this procedure done by my colleague Dr. Anthony. She is having trouble with swallowing liquids, solid foods and pills. Over the past 3 years she has loss approximately 25-30 lbs  due to her issues with swallowing. The patient does have a distant history of acid reflux, but has not been taking any medications for it. Ms Vieyra does endorse drinking a large amount of caffeinated/carbonated soda, tomato base sauces and occasional citrus fruits; after eating only sitting up for 1 hour. She does continue to smoke. She has smoked for 25+ years.    Past Medical History  She has a past medical history of Abnormal electrocardiogram (ECG) (EKG) (2022), Candidal stomatitis (2019), Cataract, Dry eyes, Glaucoma, Nicotine dependence, cigarettes, uncomplicated (2022), Personal history of other diseases of the nervous system and sense organs, Personal history of other diseases of the respiratory system, Personal history of other endocrine, nutritional and metabolic disease (2022), and Type 2 diabetes mellitus with diabetic polyneuropathy (2022).    Surgical History  She has no past surgical history on file.     Social History  She reports that she has been smoking cigarettes. She has been exposed to tobacco smoke. She has never used smokeless tobacco. She reports current drug use. Drug: Marijuana. She reports that she does not drink alcohol.    Family History  Family History[1]     Allergies  Bee venom protein (honey bee), Bee sting kit, Bupropion, Coconut, and Mushroom    Review of Systems  Unless mentioned in the HPI, all other systems have been reviewed and ar  negative.      Constitutional:   General appearance: Healthy appearing, well-nourished, well groomed.  No acute distress.  Communication: Normal communication without aids or , normal voice quality.  No hoarseness, stridor or stertor.    Psychiatric: Oriented to person, place and time.  Normal mood and affect.    Neurologic: Cranial nerves II-XII grossly intact and symmetric bilaterally.    Head and Face:  Head: Atraumatic with no masses, lesions or scarring.  Face: Normal symmetry, no paralysis, synkinesis or facial tic.  No scars or deformities.  Sinuses: Palpation of the face revealed no sinus tenderness  Salivary glands: No tenderness of the parotid gland, no parotid masses.  No tenderness of the submandibular glands, no submandibular gland masses.  TMJ: Normal, no clicking or pain with palpation.    Eyes: EOMI, PERRL, conjunctiva not edematous or erythematous    Ears: External inspection of ears with no deformity, scars or masses.  Otoscopic examination: Auditory canals with normal appearance, no cerumen obstruction, erythema or edema.  Tympanic membranes intact without middle ear effusion.  Assessment of hearing with normal clinical speech reception to voice.      Nose: External inspection of nose: No nasal lesions, lacerations or scars.  Septum midline.  No inferior turbinate hypertrophy.  Patent with good air entry bilaterally.    Oral Cavity/Mouth: No masses, lesions or ulcers along the lips, gingival or buccal mucosa.  Floor of the mouth is soft without edema or masses.  Teeth lower in fair condition; edentulous upper.  No masses, lesions or ulcers along the tongue.  Oral cavity and oropharynx mucosa moist.  Hard and soft palate intact and symmetric with no masses or lesions.  Posterior oropharynx patent.  Palate, posterior pharyngeal walls and tonsils normal, symmetric with no masses, lesions or ulcers.      Larynx: Examination by mirror of the larynx was deferred due to medical necessity of  performing endoscopy, see endoscopy findings below    Nasopharynx: Examination by mirror of the nasopharynx was deferred due to medical necessity of performing endoscopy, see endoscopy findings below    Neck: Normal appearing, symmetric, trachea midline.  No crepitus.  Thyroid: Symmetrical, no enlargement, no tenderness, no nodules.    Lymphatic: No palpable cervical lymphadenopathy, no submandibular lymphadenopathy, no supraclavicular lymphadenopathy.    Cardiovascular: Examination of peripheral vascular system shows no clubbing or cyanosis.    Respiratory: No respiratory distress increased work of breathing.  Inspection of the chest with symmetric chest expansion and normal respiratory effort.    Skin: No rashes in the head or neck      Patient ID: Patricia Vieyra is a 44 y.o. female.    Procedures    Procedure Note: Flexible Nasolaryngoscopy  Verbal informed consent was obtained from the patient/patient's guardian. 4% lidocaine mixed with phenylephrine was prepared and dripped into the nose. It was placed in the left & right naris. Following an appropriate amount of time to allow for adequate anesthesia, a flexible fiberoptic nasolaryngoscope was placed into the patient's left naris. The nasal cavity, nasopharynx, oropharynx, hypopharynx, and all endolaryngeal structures were visualized and were normal except as listed below. Significant findings included:    -Bilateral vocal cord movement normal  -incomplete closure, posterior glottic gap  -Widely patent subglottic airway  -Inflammation   -Edema present (noted on arytenoid cartilage)  -Changes consistent with LPR        Findings discussed with Dr. Anthony.      Assessment:   44 year old female with a history of dysphagia after a vocal cord biopsy 3 years ago. Patient underwent a flexible Laryngoscopy during exam that showed edema and mild GERD/LPR symptoms.    Recommendations:  -Start Omeprazole 20 mg daily 30 minutes prior to breakfast  -GERD patient information  sheet discussed and given to patient  -Throat clearing a cough patient information discussed and given to patient  -Follow up in 3 months, repeat scope exam at that time if no improvement  -Modified Barium Swallow Study and Esophagram referral, will call if abnormal results  These alginate products are available for purchase on [employer]:    Esophageal Guardian  Gaviscon Advance (must come from the [facility])  Reflux Gourmet  RefluxRaft    Please take as directed.    Sample images. Product may appear different than pictured:      Total time spent today was 50 minutes, all of which was spent coordinating/educating patient at today's visit.    Ailyn Gregorio, APRN-CNP                    [1]  Family History  Problem Relation Name Age of Onset   • Glaucoma Mother     • Glaucoma Maternal Grandmother     Patient was identified as a fall risk. Risk prevention instructions provided.

## 2025-07-15 ENCOUNTER — APPOINTMENT (OUTPATIENT)
Dept: PHARMACY | Facility: HOSPITAL | Age: 45
End: 2025-07-15
Payer: COMMERCIAL

## 2025-07-18 PROBLEM — N31.9 NEUROGENIC URINARY BLADDER DISORDER: Status: ACTIVE | Noted: 2025-07-18

## 2025-07-20 ENCOUNTER — CLINICAL SUPPORT (OUTPATIENT)
Dept: EMERGENCY MEDICINE | Facility: HOSPITAL | Age: 45
End: 2025-07-20
Payer: MEDICAID

## 2025-07-20 ENCOUNTER — HOSPITAL ENCOUNTER (EMERGENCY)
Facility: HOSPITAL | Age: 45
Discharge: HOME | End: 2025-07-20
Attending: EMERGENCY MEDICINE
Payer: MEDICAID

## 2025-07-20 VITALS
BODY MASS INDEX: 24.64 KG/M2 | HEART RATE: 97 BPM | HEIGHT: 67 IN | SYSTOLIC BLOOD PRESSURE: 157 MMHG | OXYGEN SATURATION: 100 % | WEIGHT: 157 LBS | TEMPERATURE: 98.3 F | RESPIRATION RATE: 18 BRPM | DIASTOLIC BLOOD PRESSURE: 96 MMHG

## 2025-07-20 DIAGNOSIS — R41.3 MEMORY DEFICIT: ICD-10-CM

## 2025-07-20 DIAGNOSIS — R11.2 NAUSEA AND VOMITING, UNSPECIFIED VOMITING TYPE: Primary | ICD-10-CM

## 2025-07-20 PROBLEM — R41.82 ALTERED MENTAL STATUS: Status: ACTIVE | Noted: 2025-07-20

## 2025-07-20 LAB
ALBUMIN SERPL BCP-MCNC: 5.1 G/DL (ref 3.4–5)
ALP SERPL-CCNC: 85 U/L (ref 33–110)
ALT SERPL W P-5'-P-CCNC: 11 U/L (ref 7–45)
ANION GAP BLDV CALCULATED.4IONS-SCNC: 12 MMOL/L (ref 10–25)
ANION GAP SERPL CALC-SCNC: 19 MMOL/L (ref 10–20)
ANION GAP SERPL CALC-SCNC: 22 MMOL/L (ref 10–20)
APPEARANCE UR: CLEAR
AST SERPL W P-5'-P-CCNC: 17 U/L (ref 9–39)
ATRIAL RATE: 107 BPM
B-OH-BUTYR SERPL-SCNC: 3.36 MMOL/L (ref 0.02–0.27)
BASE EXCESS BLDV CALC-SCNC: -0.4 MMOL/L (ref -2–3)
BASOPHILS # BLD AUTO: 0.08 X10*3/UL (ref 0–0.1)
BASOPHILS NFR BLD AUTO: 0.8 %
BILIRUB SERPL-MCNC: 0.4 MG/DL (ref 0–1.2)
BILIRUB UR STRIP.AUTO-MCNC: NEGATIVE MG/DL
BODY TEMPERATURE: 37 DEGREES CELSIUS
BUN SERPL-MCNC: 22 MG/DL (ref 6–23)
BUN SERPL-MCNC: 26 MG/DL (ref 6–23)
CA-I BLDV-SCNC: 1.24 MMOL/L (ref 1.1–1.33)
CALCIUM SERPL-MCNC: 10 MG/DL (ref 8.6–10.6)
CALCIUM SERPL-MCNC: 10.9 MG/DL (ref 8.6–10.6)
CHLORIDE BLDV-SCNC: 102 MMOL/L (ref 98–107)
CHLORIDE SERPL-SCNC: 100 MMOL/L (ref 98–107)
CHLORIDE SERPL-SCNC: 98 MMOL/L (ref 98–107)
CO2 SERPL-SCNC: 22 MMOL/L (ref 21–32)
CO2 SERPL-SCNC: 25 MMOL/L (ref 21–32)
COLOR UR: ABNORMAL
CREAT SERPL-MCNC: 1.05 MG/DL (ref 0.5–1.05)
CREAT SERPL-MCNC: 1.25 MG/DL (ref 0.5–1.05)
EGFRCR SERPLBLD CKD-EPI 2021: 54 ML/MIN/1.73M*2
EGFRCR SERPLBLD CKD-EPI 2021: 67 ML/MIN/1.73M*2
EOSINOPHIL # BLD AUTO: 0.04 X10*3/UL (ref 0–0.7)
EOSINOPHIL NFR BLD AUTO: 0.4 %
ERYTHROCYTE [DISTWIDTH] IN BLOOD BY AUTOMATED COUNT: 15.2 % (ref 11.5–14.5)
GLUCOSE BLD MANUAL STRIP-MCNC: 213 MG/DL (ref 74–99)
GLUCOSE BLDV-MCNC: 196 MG/DL (ref 74–99)
GLUCOSE SERPL-MCNC: 142 MG/DL (ref 74–99)
GLUCOSE SERPL-MCNC: 231 MG/DL (ref 74–99)
GLUCOSE UR STRIP.AUTO-MCNC: ABNORMAL MG/DL
HCO3 BLDV-SCNC: 25.4 MMOL/L (ref 22–26)
HCT VFR BLD AUTO: 41.6 % (ref 36–46)
HCT VFR BLD EST: 40 % (ref 36–46)
HGB BLD-MCNC: 13.8 G/DL (ref 12–16)
HGB BLDV-MCNC: 13.2 G/DL (ref 12–16)
IMM GRANULOCYTES # BLD AUTO: 0.04 X10*3/UL (ref 0–0.7)
IMM GRANULOCYTES NFR BLD AUTO: 0.4 % (ref 0–0.9)
INHALED O2 CONCENTRATION: 21 %
KETONES UR STRIP.AUTO-MCNC: ABNORMAL MG/DL
LACTATE BLDV-SCNC: 2.3 MMOL/L (ref 0.4–2)
LEUKOCYTE ESTERASE UR QL STRIP.AUTO: NEGATIVE
LIPASE SERPL-CCNC: 21 U/L (ref 9–82)
LYMPHOCYTES # BLD AUTO: 2.74 X10*3/UL (ref 1.2–4.8)
LYMPHOCYTES NFR BLD AUTO: 27.4 %
MCH RBC QN AUTO: 26.5 PG (ref 26–34)
MCHC RBC AUTO-ENTMCNC: 33.2 G/DL (ref 32–36)
MCV RBC AUTO: 80 FL (ref 80–100)
MONOCYTES # BLD AUTO: 1.02 X10*3/UL (ref 0.1–1)
MONOCYTES NFR BLD AUTO: 10.2 %
NEUTROPHILS # BLD AUTO: 6.09 X10*3/UL (ref 1.2–7.7)
NEUTROPHILS NFR BLD AUTO: 60.8 %
NITRITE UR QL STRIP.AUTO: NEGATIVE
NRBC BLD-RTO: 0 /100 WBCS (ref 0–0)
OXYHGB MFR BLDV: 32.1 % (ref 45–75)
P AXIS: 81 DEGREES
P OFFSET: 197 MS
P ONSET: 145 MS
PCO2 BLDV: 45 MM HG (ref 41–51)
PH BLDV: 7.36 PH (ref 7.33–7.43)
PH UR STRIP.AUTO: 6 [PH]
PLATELET # BLD AUTO: 390 X10*3/UL (ref 150–450)
PO2 BLDV: 26 MM HG (ref 35–45)
POTASSIUM BLDV-SCNC: 4.3 MMOL/L (ref 3.5–5.3)
POTASSIUM SERPL-SCNC: 4.4 MMOL/L (ref 3.5–5.3)
POTASSIUM SERPL-SCNC: 6.7 MMOL/L (ref 3.5–5.3)
PR INTERVAL: 164 MS
PROT SERPL-MCNC: 8.1 G/DL (ref 6.4–8.2)
PROT UR STRIP.AUTO-MCNC: ABNORMAL MG/DL
Q ONSET: 227 MS
QRS COUNT: 17 BEATS
QRS DURATION: 68 MS
QT INTERVAL: 336 MS
QTC CALCULATION(BAZETT): 448 MS
QTC FREDERICIA: 407 MS
R AXIS: 65 DEGREES
RBC # BLD AUTO: 5.21 X10*6/UL (ref 4–5.2)
RBC # UR STRIP.AUTO: ABNORMAL MG/DL
RBC #/AREA URNS AUTO: NORMAL /HPF
SAO2 % BLDV: 32 % (ref 45–75)
SODIUM BLDV-SCNC: 135 MMOL/L (ref 136–145)
SODIUM SERPL-SCNC: 137 MMOL/L (ref 136–145)
SODIUM SERPL-SCNC: 138 MMOL/L (ref 136–145)
SP GR UR STRIP.AUTO: 1.03
SQUAMOUS #/AREA URNS AUTO: NORMAL /HPF
T AXIS: 87 DEGREES
T OFFSET: 395 MS
UROBILINOGEN UR STRIP.AUTO-MCNC: ABNORMAL MG/DL
VENTRICULAR RATE: 107 BPM
WBC # BLD AUTO: 10 X10*3/UL (ref 4.4–11.3)
WBC #/AREA URNS AUTO: NORMAL /HPF

## 2025-07-20 PROCEDURE — 36415 COLL VENOUS BLD VENIPUNCTURE: CPT | Performed by: STUDENT IN AN ORGANIZED HEALTH CARE EDUCATION/TRAINING PROGRAM

## 2025-07-20 PROCEDURE — 84132 ASSAY OF SERUM POTASSIUM: CPT | Mod: 59 | Performed by: STUDENT IN AN ORGANIZED HEALTH CARE EDUCATION/TRAINING PROGRAM

## 2025-07-20 PROCEDURE — 99285 EMERGENCY DEPT VISIT HI MDM: CPT | Performed by: EMERGENCY MEDICINE

## 2025-07-20 PROCEDURE — 93010 ELECTROCARDIOGRAM REPORT: CPT | Performed by: EMERGENCY MEDICINE

## 2025-07-20 PROCEDURE — 84425 ASSAY OF VITAMIN B-1: CPT | Performed by: STUDENT IN AN ORGANIZED HEALTH CARE EDUCATION/TRAINING PROGRAM

## 2025-07-20 PROCEDURE — 82010 KETONE BODYS QUAN: CPT | Performed by: STUDENT IN AN ORGANIZED HEALTH CARE EDUCATION/TRAINING PROGRAM

## 2025-07-20 PROCEDURE — 99284 EMERGENCY DEPT VISIT MOD MDM: CPT | Mod: 25 | Performed by: EMERGENCY MEDICINE

## 2025-07-20 PROCEDURE — 81001 URINALYSIS AUTO W/SCOPE: CPT | Performed by: STUDENT IN AN ORGANIZED HEALTH CARE EDUCATION/TRAINING PROGRAM

## 2025-07-20 PROCEDURE — 2500000004 HC RX 250 GENERAL PHARMACY W/ HCPCS (ALT 636 FOR OP/ED): Mod: SE | Performed by: STUDENT IN AN ORGANIZED HEALTH CARE EDUCATION/TRAINING PROGRAM

## 2025-07-20 PROCEDURE — 93005 ELECTROCARDIOGRAM TRACING: CPT

## 2025-07-20 PROCEDURE — 80053 COMPREHEN METABOLIC PANEL: CPT | Performed by: STUDENT IN AN ORGANIZED HEALTH CARE EDUCATION/TRAINING PROGRAM

## 2025-07-20 PROCEDURE — 96361 HYDRATE IV INFUSION ADD-ON: CPT

## 2025-07-20 PROCEDURE — 82947 ASSAY GLUCOSE BLOOD QUANT: CPT

## 2025-07-20 PROCEDURE — 82947 ASSAY GLUCOSE BLOOD QUANT: CPT | Mod: 59

## 2025-07-20 PROCEDURE — 83690 ASSAY OF LIPASE: CPT | Performed by: STUDENT IN AN ORGANIZED HEALTH CARE EDUCATION/TRAINING PROGRAM

## 2025-07-20 PROCEDURE — 85025 COMPLETE CBC W/AUTO DIFF WBC: CPT | Performed by: STUDENT IN AN ORGANIZED HEALTH CARE EDUCATION/TRAINING PROGRAM

## 2025-07-20 PROCEDURE — 2500000001 HC RX 250 WO HCPCS SELF ADMINISTERED DRUGS (ALT 637 FOR MEDICARE OP): Mod: SE | Performed by: STUDENT IN AN ORGANIZED HEALTH CARE EDUCATION/TRAINING PROGRAM

## 2025-07-20 PROCEDURE — 96374 THER/PROPH/DIAG INJ IV PUSH: CPT

## 2025-07-20 PROCEDURE — 82810 BLOOD GASES O2 SAT ONLY: CPT | Mod: CCI | Performed by: STUDENT IN AN ORGANIZED HEALTH CARE EDUCATION/TRAINING PROGRAM

## 2025-07-20 RX ORDER — THIAMINE HCL 50 MG
TABLET ORAL
Qty: 30 TABLET | Refills: 0 | Status: SHIPPED | OUTPATIENT
Start: 2025-07-20 | End: 2025-08-02

## 2025-07-20 RX ORDER — LANOLIN ALCOHOL/MO/W.PET/CERES
500 CREAM (GRAM) TOPICAL DAILY
Status: DISCONTINUED | OUTPATIENT
Start: 2025-07-20 | End: 2025-07-20 | Stop reason: HOSPADM

## 2025-07-20 RX ORDER — ONDANSETRON HYDROCHLORIDE 2 MG/ML
4 INJECTION, SOLUTION INTRAVENOUS ONCE
Status: COMPLETED | OUTPATIENT
Start: 2025-07-20 | End: 2025-07-20

## 2025-07-20 RX ADMIN — THIAMINE HCL TAB 100 MG 500 MG: 100 TAB at 18:20

## 2025-07-20 RX ADMIN — ONDANSETRON 4 MG: 2 INJECTION INTRAMUSCULAR; INTRAVENOUS at 10:28

## 2025-07-20 RX ADMIN — SODIUM CHLORIDE, SODIUM LACTATE, POTASSIUM CHLORIDE, AND CALCIUM CHLORIDE 1000 ML: .6; .31; .03; .02 INJECTION, SOLUTION INTRAVENOUS at 10:48

## 2025-07-20 ASSESSMENT — LIFESTYLE VARIABLES
HAVE YOU EVER FELT YOU SHOULD CUT DOWN ON YOUR DRINKING: NO
EVER HAD A DRINK FIRST THING IN THE MORNING TO STEADY YOUR NERVES TO GET RID OF A HANGOVER: NO
HAVE PEOPLE ANNOYED YOU BY CRITICIZING YOUR DRINKING: NO
EVER FELT BAD OR GUILTY ABOUT YOUR DRINKING: NO
TOTAL SCORE: 0

## 2025-07-20 ASSESSMENT — PAIN SCALES - GENERAL: PAINLEVEL_OUTOF10: 0 - NO PAIN

## 2025-07-20 NOTE — DISCHARGE INSTRUCTIONS
Discharge home with reassurance.  Return precautions worsening memory issues, vomiting, other concerning symptoms.

## 2025-07-20 NOTE — ED PROVIDER NOTES
Emergency Department Provider Note       History of Present Illness     History provided by: Patient and Family Member  Limitations to History: Confusion  External Records Reviewed with Brief Summary: Outpatient notes showing that patient's type II diabetic, high blood pressure, and questionable deficiencies on a MoCA score that was inconsistent.  Has also seen mental health to evaluate.    HPI:  Patricia Vieyra is a 44 y.o. female presenting to the emergency department for 4-day history of vomiting.  Patient is a type II diabetic, she notes that about 4 days ago she started having persistent vomiting.  Making it very difficult to keep things down.  She was trying to tough it out at home, however family got concerned enough that she was not getting any better.  She also potentially has early onset dementia.  Patient denies fevers or chills, denies any abdominal pain.  No dysuria or hematuria, no notable constipation or diarrhea.  Patient does not know the year, however she knows her name, the name of her daughter, that they are at [facility] in the emergency department.  And able to tell us her past medical history.    Physical Exam   Triage vitals:  T 36 °C (96.8 °F)  HR (!) 112  /83  RR 16  O2 100 %      Physical Exam  Constitutional:       General: She is not in acute distress.     Appearance: Normal appearance. She is not ill-appearing.   HENT:      Head: Normocephalic and atraumatic.      Right Ear: External ear normal.      Left Ear: External ear normal.      Mouth/Throat:      Mouth: Mucous membranes are moist.     Eyes:      General:         Right eye: No discharge.         Left eye: No discharge.       Cardiovascular:      Rate and Rhythm: Normal rate.   Pulmonary:      Effort: Pulmonary effort is normal. No respiratory distress.     Musculoskeletal:         General: Normal range of motion.     Skin:     General: Skin is warm and dry.      Coloration: Skin is not jaundiced.      Neurological:      Mental Status: She is alert.      Comments: Patient cannot tell me the year, she can tell me her name and the name of her daughter, she can tell me that she is in [facility] emergency department and is in the [facility].  Patient had several transient episodes while interviewing that she would stare off and nothing.  Additionally would sometimes forget a question that had been asked several seconds earlier.   Psychiatric:         Mood and Affect: Mood normal.         Behavior: Behavior normal.           Medical Decision Making & ED Course   Medical Decision Makin y.o. female Presenting as per HPI, differential is broad and includes but not limited to dehydration, gastroenteritis, DKA, electrolyte abnormalities, vitamin deficiency, absence seizure's, other neurological disorder.   As a result, workup was ordered targeting these diagnoses including  Blood work and consult to neurology.  Workup came back remarkable for no significant actionable blood work, patient does appear to be in a ketosis likely related to dehydration.   Interventions in the ED performed were: Fluid resuscitation and Zofran with subsequent p.o. challenge that she succeeded.  Neurology was consulted for evaluation, they came and saw the patient.  They ultimately recommended high-dose thiamine, thiamine level to be collected, and then follow-up outpatient with them.  We ordered a repeat BMP to monitor patient's fluid response, however prior to resulting she requested to go home as she felt completely better at this time and her ride was here.  Due to the fact that patient's symptoms had improved, the initial lab values were consistent with dehydration, and she was fluid resuscitated.  It was deemed appropriate at this time and patient was discharged with return precautions given.  ----    Differential diagnoses considered include but are not limited to: As per [facility]    EKG Independent  Interpretation: EKG interpreted by myself. Please see ED Course for full interpretation.    Chronic conditions affecting the patient's care: As documented above in [facility]    The patient was discussed with the following consultants/services: Neurology    Care Considerations: As documented above in [facility]    ED Course:  ED Course as of 07/20/25 1926   Sun Jul 20, 2025   1029 EKG interpreted by myself shows sinus rhythm, rate of 107, regular intervals, normal axis, no concerning ST elevations [ADI]      ED Course User Index  [ADI] Garth Avitia DO         Diagnoses as of 07/20/25 1926   Nausea and vomiting, unspecified vomiting type   Memory deficit       Disposition   As a result of the work-up, the patient was discharged home.  she was informed of her diagnosis and instructed to come back with any concerns or worsening of condition.  she and was agreeable to the plan as discussed above.  she was given the opportunity to ask questions.  All of the patient's questions were answered.    Procedures   Procedures    Garth Avitia DO  Emergency Medicine                                                       Garth Avitia DO  Resident  07/20/25 1929

## 2025-07-20 NOTE — CONSULTS
Consults    History Of Present Illness  Patricia Vieyra is a 44 y.o. female presenting with 4 days of vomiting at home. Neurology consulted for history of dementia and altered mental status. She has a past medical history of T2DM  with neuropathy (A1c 13.3 04/2025), complex pain syndrome secondary to OA, functional tremor, bipolar disorder, PTSD, and psychotic symptoms. She was recently seen by her outpatient PCP and reported concerns with short term memory, MOCA performed with score 6/30. Patient was seen in hospital 03/2024 for altered mental status described as catatonic, manic behavior in ED requiring IM Zyprexa, she was discharged home from ED and per family they were called the next day and told she had a stroke on imaging. No documentation of this in the chart and only CT head available at this time from 02/2024 without acute stroke. Her PCP started her on aspirin and ordered carotid US and echo which were not done at that time.     On evaluation, patient is oriented to person, place, situation, and year, but unable to give an answer to what month we are in. Patient is slow to respond and became tearful during interview. States she is here for vomiting and it has been improving since in the ED. She notes some troubles with her memory but unable to provide specific details. Most of history obtained from her daughter. Daughter states she has been having short term memory problems for a few years. Patient will ask daughter to do something for her and then patient will then forget what she asked shortly after. Says this got worse last year after her stroke, and worsening over the last few months, but she is not currently more confused than her normal. She occasionally gets lost when going places so daughter accompanies her to all appointments. Daughter also says she has to help her go to the bathroom and feed her.     Past Medical History  Medical History[1]  Surgical History  Surgical History[2]  Social  History  Social History[3]  Allergies  Bee venom protein (honey bee), Bee sting kit, Bupropion, Coconut, and Mushroom  Prescriptions Prior to Admission[4]    Review of Systems  Neurological Exam  Physical Exam  General Appearance:  No distress, alert, interactive and cooperative.     Mental State: Orientation was normal to place and person correct year but unable to give month. Unable to remember 3 words, would not attempt guesses. Decreased attention span. Knew current president but would not provide guess for previous president.     Cranial Nerves:   CN: Visual fields full to confrontation.   CN 3, 4, 6: Pupils round, 4 mm in diameter, equally reactive to light. Lids symmetric; no ptosis. EOMs normal. Initially unable to perform R gaze, when asked to track up then right she was then able.   No nystagmus.   CN 5: Decreased sensation on L face  CN 7: Normal and symmetric facial strength. Nasolabial folds symmetric.   CN 8: Hearing intact to voice  CN 9: Palate elevates symmetrically.   CN 11: Normal strength of shoulder shrug and neck turning.   CN 12: Tongue midline, with normal bulk and strength; no fasciculations.     Motor: Muscle bulk and tone were normal in both upper and lower extremities. No fasciculations, tremor or other abnormal movements were present.      Strength      R L  Shoulder abduction (C4-C5)  5 5  Elbow flexion (C5-C6)   5 5  Elbow extension (C7-C8)  5 5        5 5    Hip flexion (IP, L2-L3)      5 5  Knee extension (Fem, L2-L4)    5 5  Knee flexion (Sci, L5-S1)      5 5  DorsiFlex (DePer, L5-S1l)             5          5  PlantarFlex (Tibial, S1-S2)          5          5    Give-way weakness of hip flexion and knee flexion bilaterally     Reflexes: symmetric    Sensory: Decreased sensation to light touch in LLE     Coordination: In both upper extremities, finger-nose-finger was intact without dysmetria or overshoot.       Last Recorded Vitals  Blood pressure 131/83, pulse (!) 112,  "temperature 36 °C (96.8 °F), resp. rate 16, height 1.702 m (5' 7\"), weight 71.2 kg (157 lb), SpO2 100%.    Relevant Results  Scheduled medications  Scheduled Medications[5]  Continuous medications  Continuous Medications[6]  PRN medications  PRN Medications[7]  Results from last 72 hours   Lab Units 07/20/25  1016   WBC AUTO x10*3/uL 10.0   NRBC AUTO /100 WBCs 0.0   RBC AUTO x10*6/uL 5.21*   HEMOGLOBIN g/dL 13.8   HEMATOCRIT % 41.6   MCV fL 80   MCH pg 26.5   MCHC g/dL 33.2   RDW % 15.2*   PLATELETS AUTO x10*3/uL 390      Results from last 72 hours   Lab Units 07/20/25  1016   GLUCOSE mg/dL 231*   SODIUM mmol/L 138   POTASSIUM mmol/L 4.4   CHLORIDE mmol/L 98   CO2 mmol/L 22   ANION GAP mmol/L 22*   BUN mg/dL 26*   CREATININE mg/dL 1.25*   EGFR mL/min/1.73m*2 54*   CALCIUM mg/dL 10.9*   ALBUMIN g/dL 5.1*      Results from last 72 hours   Lab Units 07/20/25  1016   ALK PHOS U/L 85   BILIRUBIN TOTAL mg/dL 0.4   PROTEIN TOTAL g/dL 8.1   ALT U/L 11   AST U/L 17   ALBUMIN g/dL 5.1*                    [place] Coma Scale  Best Eye Response: Spontaneous  Best Verbal Response: Oriented  Best Motor Response: Follows commands  [place] Coma Scale Score: 15                 I have personally reviewed the following imaging results:   Imaging  No results found.    Cardiology, Vascular, and Other Imaging  No other imaging results found for the past 7 days         Assessment/Plan   Assessment & Plan  Altered mental status    Patricia Vieyra is a 44 y.o. female presenting with 4 days of vomiting at home. Neurology consulted for history of dementia and altered mental status. She has a past medical history of T2DM  with neuropathy (A1c 13.3 04/2025), complex pain syndrome secondary to OA, functional tremor, bipolar disorder, PTSD, and psychotic symptoms. Per her daughter, she has had short term memory problems for a few years, but it has worsened in the last few months and she now requires assistance with ADLs and IADLs. She states she is " not acutely altered currently compared to her recent baseline at home. Patient was evaluated by PCP 05/2025 for c/f memory problems and had a MOCA of 6/30. At that time planned for possible brain imaging and neuro psych testing.     Due to her psychiatric co morbidities and concern for effort related memory deficits and non focal  exam, normal brain imaging (CT 2024), lower concern for dementia and more likely related to her psychiatric co morbidities.     Recommendations   - consider consulting psychiatry   - check thiamine level  - high dose thiamine  mg TIDx3 days then 100 mg daily   - Keep outpatient neurology and neuropsychology appointments (scheduled for 12/2025)        Jessi Isaacs MD   PGY2 Neurology     ======================================================  Senior addendum:  Ms. Vieyra is a 45yo F PMH DM, bipolar disorder, functional tremor who presents with vomiting. Neurology consulted for memory issues past few years that have worsened in past few months. Had MOCA done 5/2025 that was 6/30. Per chart review, there's documentation of auditory hallucinations as of 4/2025 and she does not sleep well. CT done in 2024 did not show atrophy concerning for a neurodegenerative process. On exam, patient is awake, oriented to self and place. Frustrated with awiting in the ED. Has inconsistent exam (did not perform R gaze initially but on re-exam had full R gaze), giveaway weakness in BLE.     Overall, evaluation for memory issues over a >2 year decline would best be done with outpatient neurology. There is not evidence to suggest a rapidly progressive dementia. I suspect her low MOCA score done outpatient was influenced by uncontrolled psychiatric problems and inattention 2/2 poor sleep. She has future neurology appointments she should keep. May treat for possible thiamine deficiency given her ongoing vomiting.     Christiana Garner MD  Neurology PGY-4  Gen Neuro Pager: [phone]       [1]   Past Medical  History:  Diagnosis Date    Abnormal electrocardiogram (ECG) (EKG) 04/14/2022    Abnormal electrocardiogram [ECG] [EKG]    Candidal stomatitis 07/22/2019    Thrush    Cataract     Dry eyes     Glaucoma     Nicotine dependence, cigarettes, uncomplicated 07/11/2022    Cigarette nicotine dependence without complication    Personal history of other diseases of the nervous system and sense organs     History of carpal tunnel syndrome    Personal history of other diseases of the respiratory system     Personal history of asthma    Personal history of other endocrine, nutritional and metabolic disease 07/11/2022    History of diabetes mellitus    Type 2 diabetes mellitus with diabetic polyneuropathy 07/11/2022    Polyneuropathy in diabetes   [2] No past surgical history on file.  [3]   Social History  Tobacco Use    Smoking status: Some Days     Types: Cigarettes     Passive exposure: Past    Smokeless tobacco: Never    Tobacco comments:     Currently receiving treatmentg   Substance Use Topics    Alcohol use: Never    Drug use: Yes     Types: Marijuana   [4] (Not in a hospital admission)  [5] perflutren lipid microspheres, 0.5-10 mL of dilution, intravenous, Once  [6]    [7]

## 2025-07-21 ENCOUNTER — PATIENT OUTREACH (OUTPATIENT)
Dept: CARE COORDINATION | Facility: CLINIC | Age: 45
End: 2025-07-21
Payer: MEDICAID

## 2025-07-21 LAB — HOLD SPECIMEN: NORMAL

## 2025-07-21 NOTE — PROGRESS NOTES
Outreach call to patient to support a smooth transition of care from recent ED visit.  Left voicemail message for patient with my contact information.  Evi Herbert RN [facility]  [phone]

## 2025-07-23 LAB — VIT B1 PYROPHOSHATE BLD-SCNC: 92 NMOL/L (ref 70–180)

## 2025-07-25 ENCOUNTER — APPOINTMENT (OUTPATIENT)
Dept: GASTROENTEROLOGY | Facility: HOSPITAL | Age: 45
End: 2025-07-25
Payer: COMMERCIAL

## 2025-07-25 ENCOUNTER — APPOINTMENT (OUTPATIENT)
Dept: PHARMACY | Facility: HOSPITAL | Age: 45
End: 2025-07-25
Payer: MEDICAID

## 2025-07-29 ENCOUNTER — APPOINTMENT (OUTPATIENT)
Dept: RADIOLOGY | Facility: HOSPITAL | Age: 45
End: 2025-07-29
Payer: MEDICAID

## 2025-08-06 ENCOUNTER — APPOINTMENT (OUTPATIENT)
Dept: RADIOLOGY | Facility: HOSPITAL | Age: 45
End: 2025-08-06
Payer: MEDICAID

## 2025-08-06 ENCOUNTER — OFFICE VISIT (OUTPATIENT)
Age: 45
End: 2025-08-06
Payer: MEDICAID

## 2025-08-06 VITALS
DIASTOLIC BLOOD PRESSURE: 80 MMHG | HEART RATE: 97 BPM | SYSTOLIC BLOOD PRESSURE: 114 MMHG | BODY MASS INDEX: 27 KG/M2 | HEIGHT: 67 IN | WEIGHT: 172 LBS

## 2025-08-06 DIAGNOSIS — I10 HYPERTENSION, UNSPECIFIED TYPE: ICD-10-CM

## 2025-08-06 DIAGNOSIS — E11.9 INSULIN DEPENDENT TYPE 2 DIABETES MELLITUS (MULTI): ICD-10-CM

## 2025-08-06 DIAGNOSIS — E11.9 TYPE 2 DIABETES MELLITUS WITHOUT COMPLICATION, UNSPECIFIED WHETHER LONG TERM INSULIN USE: ICD-10-CM

## 2025-08-06 DIAGNOSIS — Z79.4 INSULIN DEPENDENT TYPE 2 DIABETES MELLITUS (MULTI): ICD-10-CM

## 2025-08-06 DIAGNOSIS — E11.69 TYPE 2 DIABETES MELLITUS WITH OTHER SPECIFIED COMPLICATION, WITH LONG-TERM CURRENT USE OF INSULIN: ICD-10-CM

## 2025-08-06 DIAGNOSIS — Z79.4 TYPE 2 DIABETES MELLITUS WITH OTHER SPECIFIED COMPLICATION, WITH LONG-TERM CURRENT USE OF INSULIN: ICD-10-CM

## 2025-08-06 DIAGNOSIS — E11.42 DIABETIC POLYNEUROPATHY ASSOCIATED WITH TYPE 2 DIABETES MELLITUS: Primary | ICD-10-CM

## 2025-08-06 LAB — POC HEMOGLOBIN A1C: 8.7 % (ref 4.2–6.5)

## 2025-08-06 PROCEDURE — 4010F ACE/ARB THERAPY RXD/TAKEN: CPT | Performed by: NURSE PRACTITIONER

## 2025-08-06 PROCEDURE — 83036 HEMOGLOBIN GLYCOSYLATED A1C: CPT | Mod: QW | Performed by: NURSE PRACTITIONER

## 2025-08-06 PROCEDURE — 3074F SYST BP LT 130 MM HG: CPT | Performed by: NURSE PRACTITIONER

## 2025-08-06 PROCEDURE — 3052F HG A1C>EQUAL 8.0%<EQUAL 9.0%: CPT | Performed by: NURSE PRACTITIONER

## 2025-08-06 PROCEDURE — 99214 OFFICE O/P EST MOD 30 MIN: CPT | Performed by: NURSE PRACTITIONER

## 2025-08-06 PROCEDURE — 95251 CONT GLUC MNTR ANALYSIS I&R: CPT | Performed by: NURSE PRACTITIONER

## 2025-08-06 PROCEDURE — 3079F DIAST BP 80-89 MM HG: CPT | Performed by: NURSE PRACTITIONER

## 2025-08-06 PROCEDURE — 3008F BODY MASS INDEX DOCD: CPT | Performed by: NURSE PRACTITIONER

## 2025-08-06 RX ORDER — DEXTROSE 4 G
TABLET,CHEWABLE ORAL
Qty: 1 EACH | Refills: 0 | Status: SHIPPED | OUTPATIENT
Start: 2025-08-06

## 2025-08-06 RX ORDER — PIOGLITAZONE 15 MG/1
15 TABLET ORAL DAILY
Qty: 30 TABLET | Refills: 11 | Status: SHIPPED | OUTPATIENT
Start: 2025-08-06 | End: 2026-08-06

## 2025-08-06 RX ORDER — LANCETS 33 GAUGE
EACH MISCELLANEOUS
Qty: 100 EACH | Refills: 11 | Status: SHIPPED | OUTPATIENT
Start: 2025-08-06

## 2025-08-06 RX ORDER — INSULIN LISPRO 100 [IU]/ML
INJECTION, SOLUTION INTRAVENOUS; SUBCUTANEOUS
Qty: 15 ML | Refills: 11 | Status: SHIPPED | OUTPATIENT
Start: 2025-08-06

## 2025-08-06 RX ORDER — INSULIN GLARGINE 100 [IU]/ML
INJECTION, SOLUTION SUBCUTANEOUS
Qty: 30 ML | Refills: 11 | Status: SHIPPED | OUTPATIENT
Start: 2025-08-06

## 2025-08-06 RX ORDER — ISOPROPYL ALCOHOL 70 ML/100ML
1 SWAB TOPICAL DAILY
Qty: 100 EACH | Refills: 11 | Status: SHIPPED | OUTPATIENT
Start: 2025-08-06

## 2025-08-06 RX ORDER — IBUPROFEN 200 MG
CAPSULE ORAL
Qty: 100 EACH | Refills: 11 | Status: SHIPPED | OUTPATIENT
Start: 2025-08-06

## 2025-08-06 RX ORDER — METFORMIN HYDROCHLORIDE 500 MG/1
1000 TABLET, EXTENDED RELEASE ORAL
Qty: 1600 TABLET | Refills: 0 | Status: SHIPPED | OUTPATIENT
Start: 2025-08-06 | End: 2026-09-10

## 2025-08-06 ASSESSMENT — PATIENT HEALTH QUESTIONNAIRE - PHQ9
SUM OF ALL RESPONSES TO PHQ9 QUESTIONS 1 AND 2: 0
1. LITTLE INTEREST OR PLEASURE IN DOING THINGS: NOT AT ALL
2. FEELING DOWN, DEPRESSED OR HOPELESS: NOT AT ALL

## 2025-08-06 ASSESSMENT — ENCOUNTER SYMPTOMS
LOSS OF SENSATION IN FEET: 1
OCCASIONAL FEELINGS OF UNSTEADINESS: 1

## 2025-08-06 NOTE — PROGRESS NOTES
Endocrinology  4/11/2025    History of Present Illness   Patricia Vieyra is a 44 y.o. year old female with medical history of T2D, bipolar disorder, HTN, HLD, here for follow up of type 2 diabetes.     Last seen by Dr. Akhtar, 4/11/25. At that visit, she was was encouraged to wear her Avila 3. Glargine was reduced due to reported lows, and she was encouraged to take the lispro with meals and metformin and Trulicity were increased.     Since last visit, still not wearing avila. Recent ED visit 7/20/24 due to 4 days of vomiting, BHB 3.36, , corrected AG 18 - notably taking Jardiance. Patient was treated for dehydration and released (labs normalized quickly).    Today, feels unwell, general mailaise.  Thinks she has a UTI, frequency and mild burning. Reports at least 6 UTI this year.   Having issues with memory loss, seeing Neurology in December. Forgets to take her medications frequently, forgets lantus about 3x a week. Takes her pills fairly regularly as her daughter fills her pill box.     Current regimen:   - Metformin 1000 mg twice daily   - Lantus 55 units daily   - Jardiance 10 mg once daily   - Humalog 15 units with each meals  - Trulicity 4.5 mg once weekly     SMBG: Libre3 with will.    CGM INTERPRETATION:  Average blood sugar 274 mg/dL, glucose management indicator 9.9%    Glucose less than 70 mg/dL equals 0% of time worn  Glucose ranging between 70 to 180 mg/dL represented by 7% of time worn  Glucose ranging greater than 180 mg/dL represented by 93% of time worn    72 hours of data reviewed in order to inform diabetes treatment plan decision making, patient is not currently at risk for recurrent hypoglycemia safety concerns    Hypoglycemia: about once a week -early morning    Hypoglycemia awareness: Yes    Weight changes: lost about 30lbs in the last 2 months. Reports a poor appetite, but dietary recall reports frequent snacking   +increased thirst, urination, and blurry vision.     Diet:   - 1 main  meal a day - breakfast (grits, sausage, gentile ect) or dinner  - Snacks throughout the day - ice cream, potato chips, chocolate bars, fruit  - Lots of tuna fish, hamburger helper - she doesn't cook. Daughter cooks for her and cooks daily.   - has cut down on juice - rarely drinks juice now  - Drinks water throughout the night     DM history:   Year/age at diagnosis: 18 years old  - diagnosed after a syncopal event   Lab Results   Component Value Date    GYD55AG <5 04/11/2025    CPEPTIDE 1.19 04/11/2025    ZNT8A <10 04/11/2025    NTIB <5.4 04/11/2025   Concurrent glucose with cpeptide  517 mg/dl    Prior medications:   - Glipizide   Previous hospitalizations for hyperglycemia: Yes - reports 6 times, but denies ever needing insulin through IV   Complications: Neuropathy - significant pain    Health Maintenance  Eye Exam - has an appt with ophthalmology upcoming  Foot exam - has an upcoming appt with podiatry  Lipid Panel: due  Urine albumin: Albumin creatinine ratio 241 4/11/25, recheck in 6 months.     Medications     Current Outpatient Medications   Medication Instructions    albuterol (Proventil HFA) 90 mcg/actuation inhaler 2 puffs, inhalation, Every 4 hours PRN    alcohol swabs (Pro Comfort Alcohol Pads) 1 each, Topical, Daily    aspirin 81 mg, oral, Daily    atorvastatin (LIPITOR) 40 mg, oral, Daily    blood pressure test kit-medium kit 1 kit, miscellaneous, Daily    blood sugar diagnostic (Blood Glucose Test) Check blood glucose 3 time per day in case of tiara failure    blood-glucose meter misc Use to check blood glucose    diaper,brief,adult,disposable (Depend Underwear For Women Lrg) misc 1 each, miscellaneous, Daily, Use as needed for bowel and bladder incontinence    docusate sodium (COLACE) 100 mg, oral, 2 times daily    dorzolamide-timoloL (Cosopt) 22.3-6.8 mg/mL ophthalmic solution 1 drop, Both Eyes, 2 times daily    DULoxetine (Cymbalta) 60 mg DR capsule 1 capsule, Every 12 hours scheduled (0630,1830)  "   Easy Comfort Pen Needles 31 gauge x 3/16\" needle Use to inject insulin 4 times daily    fluticasone propion-salmeteroL (Advair HFA) 45-21 mcg/actuation inhaler 2 puffs, inhalation, 2 times daily RT, Rinse mouth with water after use to reduce aftertaste and incidence of candidiasis. Do not swallow.    FreeStyle Avila 3 [place] misc Use as instructed    FreeStyle Avila 3 Sensor device Apply to skin    gabapentin (NEURONTIN) 800 mg, oral, 3 times daily    hydrocortisone 1 % ointment Topical, 2 times daily, Apply to chest    hydrOXYzine pamoate (VISTARIL) 25 mg, 2 times daily PRN    ibuprofen 800 mg, oral, 3 times daily PRN    insulin glargine (Lantus Solostar U-100 Insulin) 100 unit/mL (3 mL) pen Take 55 units at bedtime    insulin lispro (HumaLOG Tempo Pen) 100 unit/mL injection Inject 15 units with each meal, take 5 units with snacks. Max daily dose 50 units.    lamoTRIgine (LaMICtal) 200 mg tablet 1 tablet, Daily (0630)    lancets (Easy Comfort Lancets) 30 gauge misc TWICE DAILY    lancets 33 gauge misc Use to check glucose 3 times daily    lidocaine (Xylocaine) 5 % ointment Topical, As needed    lisinopril 10 mg, oral, Daily    lurasidone (LATUDA) 120 mg, Daily with breakfast    melatonin 10 mg, Nightly PRN    metFORMIN XR (GLUCOPHAGE-XR) 1,000 mg, oral, 2 times daily (morning and late afternoon), Do not crush, chew, or split.    miscellaneous medical supply misc 1 each, miscellaneous, Daily PRN, Please use shower chair as needed    miscellaneous medical supply misc 1 each, miscellaneous, Daily PRN, Please use with ambulation    miscellaneous medical supply misc 1 each, miscellaneous, Daily PRN, Use daily as needed at bedside    omeprazole (PRILOSEC) 20 mg, oral, Daily, Do not crush or chew.    pioglitazone (ACTOS) 15 mg, oral, Daily    prazosin (MINIPRESS) 5 mg, Nightly    Probiotic Acidophilus 250 million cell capsule 1 capsule, Daily    Trulicity 4.5 mg, subcutaneous, Weekly    varenicline tartrate (CHANTIX) 1 " "mg, oral, 2 times daily, Take with full glass of water.        History     Past Medical History:   Diagnosis Date    Abnormal electrocardiogram (ECG) (EKG) 04/14/2022    Abnormal electrocardiogram [ECG] [EKG]    Candidal stomatitis 07/22/2019    Thrush    Cataract     Dry eyes     Glaucoma     Nicotine dependence, cigarettes, uncomplicated 07/11/2022    Cigarette nicotine dependence without complication    Personal history of other diseases of the nervous system and sense organs     History of carpal tunnel syndrome    Personal history of other diseases of the respiratory system     Personal history of asthma    Personal history of other endocrine, nutritional and metabolic disease 07/11/2022    History of diabetes mellitus    Type 2 diabetes mellitus with diabetic polyneuropathy 07/11/2022    Polyneuropathy in diabetes     History reviewed. No pertinent surgical history.    Family History   Problem Relation Name Age of Onset    Glaucoma Mother      Glaucoma Maternal Grandmother        Allergies   Allergen Reactions    Bee Venom Protein (Honey Bee) Anaphylaxis    Bee Sting Kit Swelling    Bupropion Unknown     Suicidal ideation    Coconut Hives    Mushroom Hives and Rash      Social history: Lives alone, but daughter lives 3 streets away and cooks for her daily      Physical Exam   /80   Pulse 97   Ht 1.702 m (5' 7\")   Wt 78 kg (172 lb)   BMI 26.94 kg/m²   General: Well appearing, no acute distress  Heart: Normal rate  Neck: No visible goiter   Lungs: Breathing comfortably on room air   Skin: No rashes    Labs and Imaging     Lab Results   Component Value Date    HGBA1C 8.7 (A) 08/06/2025    HGBA1C 13.3 (A) 04/11/2025    HGBA1C 9.1 (A) 08/30/2024     07/20/2025    K 6.7 (HH) 07/20/2025     07/20/2025    CO2 25 07/20/2025    BUN 22 07/20/2025    CREATININE 1.05 07/20/2025    CALCIUM 10.0 07/20/2025    ALBUMIN 5.1 (H) 07/20/2025    PROT 8.1 07/20/2025    BILITOT 0.4 07/20/2025    ALKPHOS 85 " 07/20/2025    ALT 11 07/20/2025    AST 17 07/20/2025    GLUCOSE 142 (H) 07/20/2025    CHOL 108 11/18/2022    TRIG 74 11/18/2022    HDL 64.0 11/18/2022    BHYDRXBUT 3.36 (H) 07/20/2025    CPEPTIDE 1.19 04/11/2025    OWY47FY <5 04/11/2025    NTIB <5.4 04/11/2025    ZNT8A <10 04/11/2025       Assessment and Plan   Patricia Vieyra is a 44 y.o. year old female with medical history of T2D, bipolar disorder, HTN, HLD, here for diabetes.     # T2D: BG control is not at goal 8.7, but improving (previously POC A1c 13.3%).   Memory loss is impacting her compliance, discussed strategies to ensure consistent dosing; she will set alarms for insulin and her daughter will assist with setting up her pill box and reminders.  Patient concerned about weight loss, dietary recall reveals 1 main meal and significant snacking on junk food. Encouraged more nutritious snacking and supplementing with Premier Protein shakes. If weight loss continues, can consider decreasing Trulicity dose in the future.   Patient agreeable to 5 units of lispro with high carb snacks at meal time if skipping meals  Stop Jardiance due to frequent UTIs - can re-evaluate use once glucose control improves further. Replace Jardiance with Actos as we do not want to jeopardize glucose control as A1c is steadily improving  Reviewed antibody results and c peptide: confirms type 2 diagnosis  Sick day plan: take half of glargine dose if not eating    #HTN - stable    PLAN  - continue Lantus 55 units once daily   - continue 15 units of humalog with your largest meal, take 5 units with snacks/smaller meals   - stop jardiance due to UTIs -can assess restart once glucoses have improved further  - start Actos 15 mg daily to replace Jardiance   - continue use of Avila 3  - Continue Trulicity to 4.5 mg for now, work on good nutrition - eat healthier snacks, try low sugar Premier Protein Drinks   - Continue metformin 1000 mg twice daily   - Lipids: Due - please go to the lab to  have it drawn  - Urine Alb/Cr: Albumin creatinine ratio 241 4/11/25, recheck in 6 months.   - Have your daughter help you put meds in pill box -take out Jardiance  - Set alarm nightly to take lantus  - See PCP/urgent care for UTI    RTC:   2 week CGM review with diabetes education   1 month follow up with Clinical Pharmacist   Return to Dr. Akhtar in 3 months (can schedule to Tarah Taylor PA-C if no appointments available)    KELSEY Fuller-CNP

## 2025-08-06 NOTE — PATIENT INSTRUCTIONS
- continue Lantus 55 units once daily   - continue 15 units of humalog with your largest meal, take 5 units with snacks   - stop jardiance due to UTIs -can assess restart once glucoses have improved further  - start Actos 15 mg daily to replace Jardiance   - continue use of Avila 3  - Continue Trulicity to 4.5 mg for now, work on good nutrition - eat healthier snacks, try low sugar Premier Protein Drinks   - Continue metformin to 1000 mg twice daily   - Lipids: Due - please go to the lab to have it drawn  - Urine Alb/Cr: Albumin creatinine ratio 241 4/11/25, recheck in 6 months. Urine creatinine  - Have your daughter help you put meds in pill box -take out Jardiance  - Set alarm nightly to take lantus  - See PCP/urgent care for UTI

## 2025-08-11 ENCOUNTER — OFFICE VISIT (OUTPATIENT)
Dept: PODIATRY | Facility: CLINIC | Age: 45
End: 2025-08-11
Payer: MEDICAID

## 2025-08-11 VITALS — WEIGHT: 170 LBS | HEIGHT: 67 IN | BODY MASS INDEX: 26.68 KG/M2

## 2025-08-11 DIAGNOSIS — E11.42 DIABETIC POLYNEUROPATHY ASSOCIATED WITH TYPE 2 DIABETES MELLITUS: Primary | ICD-10-CM

## 2025-08-11 DIAGNOSIS — M79.672 FOOT PAIN, BILATERAL: ICD-10-CM

## 2025-08-11 DIAGNOSIS — F17.200 TOBACCO DEPENDENCE: ICD-10-CM

## 2025-08-11 DIAGNOSIS — M79.671 FOOT PAIN, BILATERAL: ICD-10-CM

## 2025-08-11 DIAGNOSIS — R26.89 ANTALGIC GAIT: ICD-10-CM

## 2025-08-11 DIAGNOSIS — M62.81 MUSCLE WEAKNESS OF LOWER EXTREMITY: ICD-10-CM

## 2025-08-11 DIAGNOSIS — E11.42 DIABETIC POLYNEUROPATHY ASSOCIATED WITH TYPE 2 DIABETES MELLITUS: ICD-10-CM

## 2025-08-11 PROCEDURE — 4010F ACE/ARB THERAPY RXD/TAKEN: CPT | Performed by: PODIATRIST

## 2025-08-11 PROCEDURE — 99214 OFFICE O/P EST MOD 30 MIN: CPT | Performed by: PODIATRIST

## 2025-08-11 PROCEDURE — 99406 BEHAV CHNG SMOKING 3-10 MIN: CPT | Performed by: PODIATRIST

## 2025-08-11 PROCEDURE — 3052F HG A1C>EQUAL 8.0%<EQUAL 9.0%: CPT | Performed by: PODIATRIST

## 2025-08-11 PROCEDURE — 3008F BODY MASS INDEX DOCD: CPT | Performed by: PODIATRIST

## 2025-08-11 RX ORDER — MELATONIN 10 MG
10 CAPSULE ORAL NIGHTLY
COMMUNITY
Start: 2025-07-10

## 2025-08-11 RX ORDER — SAW/VIT E/SOD SEL/LYC/BETA/PYG 160-100
250 TABLET ORAL DAILY
COMMUNITY
Start: 2025-04-29

## 2025-08-11 ASSESSMENT — ENCOUNTER SYMPTOMS
DEPRESSION: 1
OCCASIONAL FEELINGS OF UNSTEADINESS: 1
LOSS OF SENSATION IN FEET: 1

## 2025-08-14 ENCOUNTER — APPOINTMENT (OUTPATIENT)
Dept: RADIOLOGY | Facility: HOSPITAL | Age: 45
End: 2025-08-14
Payer: MEDICAID

## 2025-08-14 ENCOUNTER — HOSPITAL ENCOUNTER (OUTPATIENT)
Dept: RADIOLOGY | Facility: HOSPITAL | Age: 45
End: 2025-08-14
Payer: MEDICAID

## 2025-08-25 ENCOUNTER — HOSPITAL ENCOUNTER (OUTPATIENT)
Dept: RADIOLOGY | Facility: HOSPITAL | Age: 45
Discharge: HOME | End: 2025-08-25
Payer: MEDICAID

## 2025-08-25 DIAGNOSIS — R13.10 DYSPHAGIA, UNSPECIFIED TYPE: ICD-10-CM

## 2025-08-25 PROCEDURE — 92611 MOTION FLUOROSCOPY/SWALLOW: CPT | Mod: GN

## 2025-08-25 PROCEDURE — 2500000001 HC RX 250 WO HCPCS SELF ADMINISTERED DRUGS (ALT 637 FOR MEDICARE OP): Mod: SE | Performed by: NURSE PRACTITIONER

## 2025-08-25 PROCEDURE — 74230 X-RAY XM SWLNG FUNCJ C+: CPT

## 2025-08-25 PROCEDURE — 74220 X-RAY XM ESOPHAGUS 1CNTRST: CPT

## 2025-08-25 PROCEDURE — 2500000005 HC RX 250 GENERAL PHARMACY W/O HCPCS: Mod: SE | Performed by: NURSE PRACTITIONER

## 2025-08-25 PROCEDURE — 74230 X-RAY XM SWLNG FUNCJ C+: CPT | Performed by: RADIOLOGY

## 2025-08-25 PROCEDURE — A9698 NON-RAD CONTRAST MATERIALNOC: HCPCS | Mod: SE | Performed by: NURSE PRACTITIONER

## 2025-08-25 RX ADMIN — BARIUM SULFATE 50 ML: 960 POWDER, FOR SUSPENSION ORAL at 12:53

## 2025-08-25 RX ADMIN — BARIUM SULFATE 20 ML: 400 PASTE ORAL at 12:40

## 2025-08-25 RX ADMIN — ANTACID/ANTIFLATULENT 1 PACKET: 380; 550; 10; 10 GRANULE, EFFERVESCENT ORAL at 12:49

## 2025-08-25 RX ADMIN — BARIUM SULFATE 60 ML: 400 SUSPENSION ORAL at 12:41

## 2025-08-25 RX ADMIN — BARIUM SULFATE 75 ML: 980 POWDER, FOR SUSPENSION ORAL at 12:49

## 2025-08-25 RX ADMIN — BARIUM SULFATE 80 ML: 0.81 POWDER, FOR SUSPENSION ORAL at 12:41

## 2025-08-28 ENCOUNTER — HOSPITAL ENCOUNTER (OUTPATIENT)
Dept: RADIOLOGY | Facility: HOSPITAL | Age: 45
Discharge: HOME | End: 2025-08-28
Payer: MEDICAID

## 2025-08-28 DIAGNOSIS — R92.8 MAMMOGRAM ABNORMAL: ICD-10-CM

## 2025-08-28 PROCEDURE — 76642 ULTRASOUND BREAST LIMITED: CPT | Mod: LT

## 2025-08-28 PROCEDURE — 76982 USE 1ST TARGET LESION: CPT | Mod: LT

## 2025-08-28 PROCEDURE — 77061 BREAST TOMOSYNTHESIS UNI: CPT | Mod: LT

## 2025-09-03 ENCOUNTER — APPOINTMENT (OUTPATIENT)
Dept: VASCULAR MEDICINE | Facility: HOSPITAL | Age: 45
End: 2025-09-03
Payer: MEDICAID

## 2025-09-03 DIAGNOSIS — R13.11 ORAL PHASE DYSPHAGIA: Primary | ICD-10-CM

## 2025-09-11 ENCOUNTER — APPOINTMENT (OUTPATIENT)
Dept: OPHTHALMOLOGY | Facility: CLINIC | Age: 45
End: 2025-09-11
Payer: COMMERCIAL

## 2025-09-23 ENCOUNTER — APPOINTMENT (OUTPATIENT)
Dept: PHARMACY | Facility: HOSPITAL | Age: 45
End: 2025-09-23
Payer: MEDICAID

## 2025-11-14 ENCOUNTER — APPOINTMENT (OUTPATIENT)
Dept: OPHTHALMOLOGY | Facility: CLINIC | Age: 45
End: 2025-11-14
Payer: COMMERCIAL

## 2025-12-10 ENCOUNTER — APPOINTMENT (OUTPATIENT)
Dept: DERMATOLOGY | Facility: CLINIC | Age: 45
End: 2025-12-10
Payer: COMMERCIAL

## 2026-06-09 ENCOUNTER — APPOINTMENT (OUTPATIENT)
Dept: ENDOCRINOLOGY | Facility: CLINIC | Age: 46
End: 2026-06-09
Payer: MEDICAID